# Patient Record
Sex: MALE | Employment: UNEMPLOYED | URBAN - METROPOLITAN AREA
[De-identification: names, ages, dates, MRNs, and addresses within clinical notes are randomized per-mention and may not be internally consistent; named-entity substitution may affect disease eponyms.]

---

## 2018-10-24 PROBLEM — K42.9 UMBILICAL HERNIA WITHOUT OBSTRUCTION AND WITHOUT GANGRENE: Status: ACTIVE | Noted: 2018-10-24

## 2020-09-23 ENCOUNTER — HOSPITAL ENCOUNTER (OUTPATIENT)
Age: 55
Setting detail: SPECIMEN
Discharge: HOME | End: 2020-09-23
Payer: COMMERCIAL

## 2020-09-23 ENCOUNTER — OFFICE VISIT (OUTPATIENT)
Dept: PRIMARY CARE CLINIC | Age: 55
End: 2020-09-23
Payer: COMMERCIAL

## 2020-09-23 VITALS
HEIGHT: 67 IN | HEART RATE: 77 BPM | BODY MASS INDEX: 29.91 KG/M2 | WEIGHT: 190.6 LBS | OXYGEN SATURATION: 93 % | DIASTOLIC BLOOD PRESSURE: 82 MMHG | SYSTOLIC BLOOD PRESSURE: 136 MMHG

## 2020-09-23 LAB
ALBUMIN SERPL-MCNC: 4.1 G/DL (ref 3.5–5.2)
ALBUMIN/GLOBULIN RATIO: 1.3 (ref 1–2.5)
ALP BLD-CCNC: 78 U/L (ref 40–129)
ALT SERPL-CCNC: 18 U/L (ref 5–41)
ANION GAP SERPL CALCULATED.3IONS-SCNC: 17 MMOL/L (ref 9–17)
AST SERPL-CCNC: 21 U/L
BILIRUB SERPL-MCNC: 0.63 MG/DL (ref 0.3–1.2)
BILIRUBIN DIRECT: 0.14 MG/DL
BILIRUBIN, INDIRECT: 0.49 MG/DL (ref 0–1)
BUN BLDV-MCNC: 14 MG/DL (ref 6–20)
BUN/CREAT BLD: NORMAL (ref 9–20)
CALCIUM SERPL-MCNC: 9.3 MG/DL (ref 8.6–10.4)
CHLORIDE BLD-SCNC: 103 MMOL/L (ref 98–107)
CHOLESTEROL, FASTING: 235 MG/DL
CHOLESTEROL/HDL RATIO: 6.2
CO2: 24 MMOL/L (ref 20–31)
CREAT SERPL-MCNC: 0.98 MG/DL (ref 0.7–1.2)
GFR AFRICAN AMERICAN: >60 ML/MIN
GFR NON-AFRICAN AMERICAN: >60 ML/MIN
GFR SERPL CREATININE-BSD FRML MDRD: NORMAL ML/MIN/{1.73_M2}
GFR SERPL CREATININE-BSD FRML MDRD: NORMAL ML/MIN/{1.73_M2}
GLOBULIN: NORMAL G/DL (ref 1.5–3.8)
GLUCOSE FASTING: 81 MG/DL (ref 70–99)
HDLC SERPL-MCNC: 38 MG/DL
LDL CHOLESTEROL: 171 MG/DL (ref 0–130)
POTASSIUM SERPL-SCNC: 4.4 MMOL/L (ref 3.7–5.3)
PROSTATE SPECIFIC ANTIGEN: 1.56 UG/L
SODIUM BLD-SCNC: 144 MMOL/L (ref 135–144)
TOTAL PROTEIN: 7.3 G/DL (ref 6.4–8.3)
TRIGLYCERIDE, FASTING: 128 MG/DL
VLDLC SERPL CALC-MCNC: ABNORMAL MG/DL (ref 1–30)

## 2020-09-23 PROCEDURE — 99396 PREV VISIT EST AGE 40-64: CPT | Performed by: FAMILY MEDICINE

## 2020-09-23 ASSESSMENT — ENCOUNTER SYMPTOMS
NAUSEA: 0
COUGH: 0
DIARRHEA: 0
ABDOMINAL PAIN: 0
WHEEZING: 0
EYE REDNESS: 0
SORE THROAT: 0
SHORTNESS OF BREATH: 0
RHINORRHEA: 0
VOMITING: 0
EYE DISCHARGE: 0

## 2020-09-23 ASSESSMENT — PATIENT HEALTH QUESTIONNAIRE - PHQ9
1. LITTLE INTEREST OR PLEASURE IN DOING THINGS: 0
2. FEELING DOWN, DEPRESSED OR HOPELESS: 0
SUM OF ALL RESPONSES TO PHQ9 QUESTIONS 1 & 2: 0
SUM OF ALL RESPONSES TO PHQ QUESTIONS 1-9: 0
SUM OF ALL RESPONSES TO PHQ QUESTIONS 1-9: 0

## 2020-09-23 NOTE — PROGRESS NOTES
[facility] PRIMARY CARE  [address] [facility] [address]  Dept: [phone]    Arsen Woodruff is a 54 y.o. male who presents today for his medical conditions/complaintsas noted below. Chief Complaint   Patient presents with    Annual Exam    Flu Vaccine     Declined       HPI:     HPI  Patient here for annual exam.  Denies any chest pain or shortness of breath. No lightheadedness or dizziness. Patient had colonoscopy 2018 that showed adenomatous polyp. Denies any melena or hematochezia. Patient states walks on a regular basis and has no limitations. Patient does not smoke. LDL Calculated (mg/dL)   Date Value   11/07/2018 146       (goal LDL is <100)   No results found for: AST, ALT, BUN  BP Readings from Last 3 Encounters:   09/23/20 136/82   10/24/18 136/88          (goal 120/80)    No past medical history on file. Past Surgical History:   Procedure Laterality Date    KNEE ARTHROSCOPY         Family History   Problem Relation Age of Onset    Breast Cancer Mother     Heart Disease Father     Diabetes Paternal Grandmother        Social History     Tobacco Use    Smoking status: Never Smoker    Smokeless tobacco: Never Used   Substance Use Topics    Alcohol use: Not on file     Comment: rarely      No current outpatient medications on file. No current facility-administered medications for this visit.       No Known Allergies    Health Maintenance   Topic Date Due    HIV screen  08/30/1980    DTaP/Tdap/Td vaccine (1 - Tdap) 08/30/1984    Shingles Vaccine (1 of 2) 08/30/2015    Flu vaccine (1) 09/01/2020    Diabetes screen  11/07/2021    Lipid screen  11/07/2023    Colon cancer screen colonoscopy  11/15/2028    Hepatitis C screen  Completed    Hepatitis A vaccine  Aged Out    Hepatitis B vaccine  Aged Out    Hib vaccine  Aged Out    Meningococcal (ACWY) vaccine  Aged Out    Pneumococcal 0-64 years Vaccine  Aged Out       Subjective: Review of Systems   Constitutional: Negative for chills and fever. HENT: Negative for rhinorrhea and sore throat. Eyes: Negative for discharge and redness. Respiratory: Negative for cough, shortness of breath and wheezing. Cardiovascular: Negative for chest pain and palpitations. Gastrointestinal: Negative for abdominal pain, diarrhea, nausea and vomiting. Genitourinary: Negative for dysuria and frequency. Musculoskeletal: Negative for arthralgias and myalgias. Neurological: Negative for dizziness, light-headedness and headaches. Psychiatric/Behavioral: Negative for sleep disturbance. Objective:     /82   Pulse 77   Ht 5' 6.96\" (1.701 m)   Wt 190 lb 9.6 oz (86.5 kg)   SpO2 93%   BMI 29.89 kg/m²   Physical Exam  Vitals signs and nursing note reviewed. Constitutional:       General: He is not in acute distress. Appearance: He is well-developed. He is not ill-appearing. HENT:      Head: Normocephalic and atraumatic. Right Ear: External ear normal.      Left Ear: External ear normal.   Eyes:      General: No scleral icterus. Right eye: No discharge. Left eye: No discharge. Conjunctiva/sclera: Conjunctivae normal.      Pupils: Pupils are equal, round, and reactive to light. Neck:      Thyroid: No thyromegaly. Trachea: No tracheal deviation. Cardiovascular:      Rate and Rhythm: Normal rate and regular rhythm. Heart sounds: Normal heart sounds. Pulmonary:      Effort: Pulmonary effort is normal. No respiratory distress. Breath sounds: Normal breath sounds. No wheezing. Lymphadenopathy:      Cervical: No cervical adenopathy. Skin:     General: Skin is warm. Findings: No rash. Neurological:      Mental Status: He is alert and oriented to person, place, and time. Psychiatric:         Mood and Affect: Mood normal.         Behavior: Behavior normal.         Thought Content:  Thought content normal.         Assessment:

## 2020-12-16 ENCOUNTER — HOSPITAL ENCOUNTER (OUTPATIENT)
Age: 55
Setting detail: SPECIMEN
Discharge: HOME | End: 2020-12-16
Payer: COMMERCIAL

## 2020-12-16 LAB
CHOLESTEROL, FASTING: 254 MG/DL
CHOLESTEROL/HDL RATIO: 4
HDLC SERPL-MCNC: 64 MG/DL
LDL CHOLESTEROL: 178 MG/DL (ref 0–130)
TRIGLYCERIDE, FASTING: 58 MG/DL
VLDLC SERPL CALC-MCNC: ABNORMAL MG/DL (ref 1–30)

## 2024-03-08 ENCOUNTER — HOSPITAL ENCOUNTER (INPATIENT)
Age: 59
LOS: 4 days | Discharge: HOME | DRG: 308 | End: 2024-03-12
Attending: EMERGENCY MEDICINE | Admitting: STUDENT IN AN ORGANIZED HEALTH CARE EDUCATION/TRAINING PROGRAM
Payer: COMMERCIAL

## 2024-03-08 ENCOUNTER — APPOINTMENT (OUTPATIENT)
Dept: GENERAL RADIOLOGY | Age: 59
DRG: 308 | End: 2024-03-08
Payer: COMMERCIAL

## 2024-03-08 DIAGNOSIS — R09.89 PULMONARY CONGESTION: ICD-10-CM

## 2024-03-08 DIAGNOSIS — E87.6 HYPOKALEMIA: ICD-10-CM

## 2024-03-08 DIAGNOSIS — I48.92 ATRIAL FLUTTER WITH RAPID VENTRICULAR RESPONSE (HCC): Primary | ICD-10-CM

## 2024-03-08 DIAGNOSIS — I48.91 ATRIAL FIBRILLATION, UNSPECIFIED TYPE (HCC): ICD-10-CM

## 2024-03-08 DIAGNOSIS — R79.89 ELEVATED BRAIN NATRIURETIC PEPTIDE (BNP) LEVEL: ICD-10-CM

## 2024-03-08 LAB
ALBUMIN SERPL-MCNC: 3.8 G/DL (ref 3.5–5.2)
ALBUMIN/GLOB SERPL: 1.4 {RATIO} (ref 1–2.5)
ALP SERPL-CCNC: 91 U/L (ref 40–129)
ALT SERPL-CCNC: 37 U/L (ref 5–41)
AMPHET UR QL SCN: NEGATIVE
ANION GAP SERPL CALCULATED.3IONS-SCNC: 12 MMOL/L (ref 9–17)
APAP SERPL-MCNC: <5 UG/ML (ref 10–30)
AST SERPL-CCNC: 28 U/L
BARBITURATES UR QL SCN: NEGATIVE
BASOPHILS # BLD: 0.1 K/UL (ref 0–0.2)
BASOPHILS NFR BLD: 1 % (ref 0–2)
BENZODIAZ UR QL: NEGATIVE
BILIRUB SERPL-MCNC: 0.9 MG/DL (ref 0.3–1.2)
BILIRUB UR QL STRIP: NEGATIVE
BNP SERPL-MCNC: 4119 PG/ML
BUN SERPL-MCNC: 17 MG/DL (ref 6–20)
CALCIUM SERPL-MCNC: 8.5 MG/DL (ref 8.6–10.4)
CANNABINOIDS UR QL SCN: NEGATIVE
CHLORIDE SERPL-SCNC: 106 MMOL/L (ref 98–107)
CLARITY UR: CLEAR
CO2 SERPL-SCNC: 25 MMOL/L (ref 20–31)
COCAINE UR QL SCN: NEGATIVE
COLOR UR: YELLOW
CREAT SERPL-MCNC: 1.2 MG/DL (ref 0.7–1.2)
EOSINOPHIL # BLD: 0.1 K/UL (ref 0–0.4)
EOSINOPHILS RELATIVE PERCENT: 2 % (ref 1–4)
EPI CELLS #/AREA URNS HPF: ABNORMAL /HPF (ref 0–5)
ERYTHROCYTE [DISTWIDTH] IN BLOOD BY AUTOMATED COUNT: 14.1 % (ref 12.5–15.4)
ETHANOL PERCENT: <0.01 %
ETHANOLAMINE SERPL-MCNC: <10 MG/DL
FENTANYL UR QL: NEGATIVE
GFR SERPL CREATININE-BSD FRML MDRD: >60 ML/MIN/1.73M2
GLUCOSE SERPL-MCNC: 105 MG/DL (ref 70–99)
GLUCOSE UR STRIP-MCNC: NEGATIVE MG/DL
HCT VFR BLD AUTO: 39 % (ref 41–53)
HGB BLD-MCNC: 13.4 G/DL (ref 13.5–17.5)
HGB UR QL STRIP.AUTO: NEGATIVE
INR PPP: 1
KETONES UR STRIP-MCNC: ABNORMAL MG/DL
LACTATE BLDV-SCNC: 1.6 MMOL/L (ref 0.5–2.2)
LEUKOCYTE ESTERASE UR QL STRIP: NEGATIVE
LYMPHOCYTES NFR BLD: 1.2 K/UL (ref 1–4.8)
LYMPHOCYTES RELATIVE PERCENT: 15 % (ref 24–44)
MAGNESIUM SERPL-MCNC: 2.4 MG/DL (ref 1.6–2.6)
MCH RBC QN AUTO: 30.9 PG (ref 26–34)
MCHC RBC AUTO-ENTMCNC: 34.4 G/DL (ref 31–37)
MCV RBC AUTO: 89.9 FL (ref 80–100)
METHADONE UR QL: NEGATIVE
MONOCYTES NFR BLD: 0.7 K/UL (ref 0.1–1.2)
MONOCYTES NFR BLD: 9 % (ref 2–11)
MUCOUS THREADS URNS QL MICRO: ABNORMAL
NEUTROPHILS NFR BLD: 73 % (ref 36–66)
NEUTS SEG NFR BLD: 5.7 K/UL (ref 1.8–7.7)
NITRITE UR QL STRIP: NEGATIVE
OPIATES UR QL SCN: NEGATIVE
OXYCODONE UR QL SCN: NEGATIVE
PARTIAL THROMBOPLASTIN TIME: 25.4 SEC (ref 21.3–31.3)
PARTIAL THROMBOPLASTIN TIME: 32 SEC (ref 21.3–31.3)
PCP UR QL SCN: NEGATIVE
PH UR STRIP: 6 [PH] (ref 5–8)
PLATELET # BLD AUTO: 197 K/UL (ref 140–450)
PMV BLD AUTO: 8.8 FL (ref 6–12)
POTASSIUM SERPL-SCNC: 3.5 MMOL/L (ref 3.7–5.3)
PROT SERPL-MCNC: 6.5 G/DL (ref 6.4–8.3)
PROT UR STRIP-MCNC: NEGATIVE MG/DL
PROTHROMBIN TIME: 11.1 SEC (ref 9.4–12.6)
RBC # BLD AUTO: 4.34 M/UL (ref 4.5–5.9)
RBC #/AREA URNS HPF: ABNORMAL /HPF (ref 0–2)
SALICYLATES SERPL-MCNC: <1 MG/DL (ref 3–10)
SODIUM SERPL-SCNC: 143 MMOL/L (ref 135–144)
SP GR UR STRIP: 1.02 (ref 1–1.03)
TEST INFORMATION: NORMAL
TROPONIN I SERPL HS-MCNC: 16 NG/L (ref 0–22)
TROPONIN I SERPL HS-MCNC: 17 NG/L (ref 0–22)
TSH SERPL DL<=0.05 MIU/L-ACNC: 3.61 UIU/ML (ref 0.3–5)
UROBILINOGEN UR STRIP-ACNC: NORMAL EU/DL (ref 0–1)
WBC #/AREA URNS HPF: ABNORMAL /HPF (ref 0–5)
WBC OTHER # BLD: 7.8 K/UL (ref 3.5–11)

## 2024-03-08 PROCEDURE — 83735 ASSAY OF MAGNESIUM: CPT

## 2024-03-08 PROCEDURE — 80179 DRUG ASSAY SALICYLATE: CPT

## 2024-03-08 PROCEDURE — 6360000002 HC RX W HCPCS: Performed by: EMERGENCY MEDICINE

## 2024-03-08 PROCEDURE — 96374 THER/PROPH/DIAG INJ IV PUSH: CPT

## 2024-03-08 PROCEDURE — 93005 ELECTROCARDIOGRAM TRACING: CPT | Performed by: EMERGENCY MEDICINE

## 2024-03-08 PROCEDURE — 71045 X-RAY EXAM CHEST 1 VIEW: CPT

## 2024-03-08 PROCEDURE — 6370000000 HC RX 637 (ALT 250 FOR IP): Performed by: EMERGENCY MEDICINE

## 2024-03-08 PROCEDURE — 81001 URINALYSIS AUTO W/SCOPE: CPT

## 2024-03-08 PROCEDURE — 2500000003 HC RX 250 WO HCPCS

## 2024-03-08 PROCEDURE — 84443 ASSAY THYROID STIM HORMONE: CPT

## 2024-03-08 PROCEDURE — 85610 PROTHROMBIN TIME: CPT

## 2024-03-08 PROCEDURE — 83880 ASSAY OF NATRIURETIC PEPTIDE: CPT

## 2024-03-08 PROCEDURE — 99285 EMERGENCY DEPT VISIT HI MDM: CPT

## 2024-03-08 PROCEDURE — 2580000003 HC RX 258: Performed by: STUDENT IN AN ORGANIZED HEALTH CARE EDUCATION/TRAINING PROGRAM

## 2024-03-08 PROCEDURE — 96375 TX/PRO/DX INJ NEW DRUG ADDON: CPT

## 2024-03-08 PROCEDURE — 80143 DRUG ASSAY ACETAMINOPHEN: CPT

## 2024-03-08 PROCEDURE — 2500000003 HC RX 250 WO HCPCS: Performed by: EMERGENCY MEDICINE

## 2024-03-08 PROCEDURE — 1210000000 HC MED SURG R&B

## 2024-03-08 PROCEDURE — G0480 DRUG TEST DEF 1-7 CLASSES: HCPCS

## 2024-03-08 PROCEDURE — 85730 THROMBOPLASTIN TIME PARTIAL: CPT

## 2024-03-08 PROCEDURE — 2580000003 HC RX 258: Performed by: EMERGENCY MEDICINE

## 2024-03-08 PROCEDURE — 99221 1ST HOSP IP/OBS SF/LOW 40: CPT | Performed by: STUDENT IN AN ORGANIZED HEALTH CARE EDUCATION/TRAINING PROGRAM

## 2024-03-08 PROCEDURE — 80053 COMPREHEN METABOLIC PANEL: CPT

## 2024-03-08 PROCEDURE — 36415 COLL VENOUS BLD VENIPUNCTURE: CPT

## 2024-03-08 PROCEDURE — 84484 ASSAY OF TROPONIN QUANT: CPT

## 2024-03-08 PROCEDURE — 85025 COMPLETE CBC W/AUTO DIFF WBC: CPT

## 2024-03-08 PROCEDURE — 6370000000 HC RX 637 (ALT 250 FOR IP): Performed by: STUDENT IN AN ORGANIZED HEALTH CARE EDUCATION/TRAINING PROGRAM

## 2024-03-08 PROCEDURE — 80307 DRUG TEST PRSMV CHEM ANLYZR: CPT

## 2024-03-08 PROCEDURE — 83605 ASSAY OF LACTIC ACID: CPT

## 2024-03-08 RX ORDER — POTASSIUM CHLORIDE 20 MEQ/1
40 TABLET, EXTENDED RELEASE ORAL PRN
Status: DISCONTINUED | OUTPATIENT
Start: 2024-03-08 | End: 2024-03-12 | Stop reason: HOSPADM

## 2024-03-08 RX ORDER — ONDANSETRON 4 MG/1
4 TABLET, ORALLY DISINTEGRATING ORAL EVERY 8 HOURS PRN
Status: DISCONTINUED | OUTPATIENT
Start: 2024-03-08 | End: 2024-03-12 | Stop reason: HOSPADM

## 2024-03-08 RX ORDER — HEPARIN SODIUM 1000 [USP'U]/ML
4000 INJECTION, SOLUTION INTRAVENOUS; SUBCUTANEOUS ONCE
Status: COMPLETED | OUTPATIENT
Start: 2024-03-08 | End: 2024-03-08

## 2024-03-08 RX ORDER — DILTIAZEM HYDROCHLORIDE 5 MG/ML
5 INJECTION INTRAVENOUS ONCE
Status: COMPLETED | OUTPATIENT
Start: 2024-03-08 | End: 2024-03-08

## 2024-03-08 RX ORDER — POTASSIUM CHLORIDE 20 MEQ/1
40 TABLET, EXTENDED RELEASE ORAL ONCE
Status: COMPLETED | OUTPATIENT
Start: 2024-03-08 | End: 2024-03-08

## 2024-03-08 RX ORDER — HEPARIN SODIUM 1000 [USP'U]/ML
4000 INJECTION, SOLUTION INTRAVENOUS; SUBCUTANEOUS PRN
Status: DISCONTINUED | OUTPATIENT
Start: 2024-03-08 | End: 2024-03-12

## 2024-03-08 RX ORDER — FUROSEMIDE 10 MG/ML
20 INJECTION INTRAMUSCULAR; INTRAVENOUS DAILY
Status: DISCONTINUED | OUTPATIENT
Start: 2024-03-09 | End: 2024-03-12

## 2024-03-08 RX ORDER — FUROSEMIDE 10 MG/ML
20 INJECTION INTRAMUSCULAR; INTRAVENOUS ONCE
Status: COMPLETED | OUTPATIENT
Start: 2024-03-08 | End: 2024-03-08

## 2024-03-08 RX ORDER — POTASSIUM CHLORIDE 7.45 MG/ML
10 INJECTION INTRAVENOUS ONCE
Status: COMPLETED | OUTPATIENT
Start: 2024-03-08 | End: 2024-03-08

## 2024-03-08 RX ORDER — SODIUM CHLORIDE 0.9 % (FLUSH) 0.9 %
5-40 SYRINGE (ML) INJECTION EVERY 12 HOURS SCHEDULED
Status: DISCONTINUED | OUTPATIENT
Start: 2024-03-08 | End: 2024-03-12 | Stop reason: HOSPADM

## 2024-03-08 RX ORDER — MAGNESIUM SULFATE IN WATER 40 MG/ML
2000 INJECTION, SOLUTION INTRAVENOUS PRN
Status: DISCONTINUED | OUTPATIENT
Start: 2024-03-08 | End: 2024-03-12 | Stop reason: HOSPADM

## 2024-03-08 RX ORDER — POLYETHYLENE GLYCOL 3350 17 G/17G
17 POWDER, FOR SOLUTION ORAL DAILY PRN
Status: DISCONTINUED | OUTPATIENT
Start: 2024-03-08 | End: 2024-03-12 | Stop reason: HOSPADM

## 2024-03-08 RX ORDER — SODIUM CHLORIDE 0.9 % (FLUSH) 0.9 %
5-40 SYRINGE (ML) INJECTION PRN
Status: DISCONTINUED | OUTPATIENT
Start: 2024-03-08 | End: 2024-03-12 | Stop reason: HOSPADM

## 2024-03-08 RX ORDER — ONDANSETRON 2 MG/ML
4 INJECTION INTRAMUSCULAR; INTRAVENOUS EVERY 6 HOURS PRN
Status: DISCONTINUED | OUTPATIENT
Start: 2024-03-08 | End: 2024-03-12 | Stop reason: HOSPADM

## 2024-03-08 RX ORDER — POTASSIUM CHLORIDE 7.45 MG/ML
10 INJECTION INTRAVENOUS PRN
Status: DISCONTINUED | OUTPATIENT
Start: 2024-03-08 | End: 2024-03-12 | Stop reason: HOSPADM

## 2024-03-08 RX ORDER — HEPARIN SODIUM 10000 [USP'U]/100ML
5-30 INJECTION, SOLUTION INTRAVENOUS CONTINUOUS
Status: DISCONTINUED | OUTPATIENT
Start: 2024-03-08 | End: 2024-03-12

## 2024-03-08 RX ORDER — ACETAMINOPHEN 325 MG/1
650 TABLET ORAL EVERY 6 HOURS PRN
Status: DISCONTINUED | OUTPATIENT
Start: 2024-03-08 | End: 2024-03-12 | Stop reason: HOSPADM

## 2024-03-08 RX ORDER — HEPARIN SODIUM 1000 [USP'U]/ML
2000 INJECTION, SOLUTION INTRAVENOUS; SUBCUTANEOUS PRN
Status: DISCONTINUED | OUTPATIENT
Start: 2024-03-08 | End: 2024-03-12

## 2024-03-08 RX ORDER — ACETAMINOPHEN 650 MG/1
650 SUPPOSITORY RECTAL EVERY 6 HOURS PRN
Status: DISCONTINUED | OUTPATIENT
Start: 2024-03-08 | End: 2024-03-12 | Stop reason: HOSPADM

## 2024-03-08 RX ORDER — SODIUM CHLORIDE 9 MG/ML
INJECTION, SOLUTION INTRAVENOUS PRN
Status: DISCONTINUED | OUTPATIENT
Start: 2024-03-08 | End: 2024-03-12 | Stop reason: HOSPADM

## 2024-03-08 RX ORDER — DILTIAZEM HYDROCHLORIDE 5 MG/ML
INJECTION INTRAVENOUS
Status: COMPLETED
Start: 2024-03-08 | End: 2024-03-08

## 2024-03-08 RX ADMIN — METOPROLOL TARTRATE 25 MG: 25 TABLET, FILM COATED ORAL at 22:54

## 2024-03-08 RX ADMIN — POTASSIUM CHLORIDE 10 MEQ: 7.46 INJECTION, SOLUTION INTRAVENOUS at 14:59

## 2024-03-08 RX ADMIN — HEPARIN SODIUM 4000 UNITS: 1000 INJECTION INTRAVENOUS; SUBCUTANEOUS at 14:20

## 2024-03-08 RX ADMIN — SODIUM CHLORIDE, PRESERVATIVE FREE 10 ML: 5 INJECTION INTRAVENOUS at 22:53

## 2024-03-08 RX ADMIN — DILTIAZEM HYDROCHLORIDE 5 MG: 5 INJECTION INTRAVENOUS at 15:33

## 2024-03-08 RX ADMIN — FUROSEMIDE 20 MG: 10 INJECTION, SOLUTION INTRAMUSCULAR; INTRAVENOUS at 15:19

## 2024-03-08 RX ADMIN — POTASSIUM CHLORIDE 40 MEQ: 1500 TABLET, EXTENDED RELEASE ORAL at 14:51

## 2024-03-08 RX ADMIN — HEPARIN SODIUM 10 UNITS/KG/HR: 10000 INJECTION, SOLUTION INTRAVENOUS at 14:21

## 2024-03-08 RX ADMIN — DILTIAZEM HYDROCHLORIDE 5 MG/HR: 5 INJECTION, SOLUTION INTRAVENOUS at 14:13

## 2024-03-08 RX ADMIN — HEPARIN SODIUM 2000 UNITS: 1000 INJECTION INTRAVENOUS; SUBCUTANEOUS at 21:49

## 2024-03-08 RX ADMIN — DILTIAZEM HYDROCHLORIDE 12.5 MG/HR: 5 INJECTION, SOLUTION INTRAVENOUS at 22:53

## 2024-03-08 NOTE — H&P
[facility]  Office: [phone]  Carl Cardenas DO, Yonathan Mejia DO, Jalen Benjamin DO, Toni Green DO, Loren Tam MD, Gale Nguyen MD, Daphnei De Guzman MD, Marielle Pérez MD,  Raphael Berg MD, Laz Dooley MD, Brock Antoine MD,  Yudelka Carrillo DO, Urbano Manjarrez MD, Arian Juan MD, Troy Cardenas DO, Livier Dozier MD,  Hernan Montalvo DO, Annamarie Vegas MD, Blanca Rodriguez MD, Fátima Collado MD, Matthew Borrego MD,  Forrest Corbett MD, Yasmany Macias MD, Elvis Mccarty MD, Sridhar Damon MD, Herbie Johnson MD, Gatito He MD, Feliciano Hussein DO, Dwayne Martinez DO, Awa Thapa MD,  Reese Lucero MD, Shirley Waterhouse, CNP,  Ludy Hollis, CNP, Yfn Agosto, CNP,  Tasha Mcclain, DNP, Margarita Crouch, CNP, Julissa Hernandez, CNP, Jazmine Gordon CNP, Elana Ferreira, CNP, Fani Travis, CNP, Viv Stephens, PA-C, Amanda Robles, PA-C, Genesis Macias, CNP, Allyson Tay, CNP, Elise Swan, CNP, Rebeka Eagle, CNS, Dayanara Levy, CNP, Mildred Grewal, CNP, Tracy Schwab, CNP         [facility]   IN-PATIENT SERVICE   [facility]    HISTORY AND PHYSICAL EXAMINATION            Date:   3/8/2024  Patient name:  Antony Vieyra  Date of admission:  3/8/2024  1:32 PM  MRN:   [id number]  Account:  [id number]  YOB: 1965  PCP:    No primary care provider on file.  Room:   ER07/ER07  Code Status:    No Order    Chief Complaint:     Chief Complaint   Patient presents with    Chest Pain    Shortness of Breath       History Obtained From:     patient    History of Present Illness:     Antony Vieyra is a 58 y.o. Non- / non  male who presents with Chest Pain and Shortness of Breath   and is admitted to the hospital for the management of A-fib (HCC).    58-year-old male with no significant past medical history who initially presented to urgent care due to feeling sick for the last few days, generalized weakness lightheaded, difficulty breathing        Plan:     Patient status inpatient in the Med/Surge    New onset of A-fib RVR, continue with diltiazem drip, will start metoprolol and will try to wean off drip, heparin drip, monitor on telemetry, correct electrolyte abnormality to keep potassium above 4 and magnesium above 2, 2D echo to evaluate ejection fraction or any wall motion or valvular abnormalities, cardiology was consulted will follow recommendation  DVT prophylaxis on heparin  Hypokalemia will replace and monitor    Consultations:   IP CONSULT TO CARDIOLOGY  IP CONSULT TO HOSPITALIST     Patient is admitted as inpatient status because of co-morbidities listed above, severity of signs and symptoms as outlined, requirement for current medical therapies and most importantly because of direct risk to patient if care not provided in a hospital setting.  Expected length of stay > 48 hours.    Sridhar Damon MD  3/8/2024  4:47 PM    Copy sent to Dr. Puentes primary care provider on file.

## 2024-03-08 NOTE — ED TRIAGE NOTES
PT presents to Ed with c/o CP and SOB pt was in AFIB RVR for EMS and received cardizem IVP enroute to Ed

## 2024-03-08 NOTE — ED PROVIDER NOTES
file     Social Determinants of Health     Financial Resource Strain: Not on file   Food Insecurity: Not on file   Transportation Needs: Not on file   Physical Activity: Not on file   Stress: Not on file   Social Connections: Not on file   Intimate Partner Violence: Not on file   Housing Stability: Not on file       Family History   Problem Relation Age of Onset    Breast Cancer Mother     Heart Disease Father     Diabetes Paternal Grandmother        Allergies:    Patient has no known allergies.    Home Medications:  Prior to Admission medications    Not on File       REVIEW OF SYSTEMS     Review of Systems   Constitutional:  Negative for diaphoresis.   Respiratory:  Positive for chest tightness and shortness of breath.    Cardiovascular:  Positive for chest pain and palpitations. Negative for leg swelling.   Gastrointestinal:  Positive for nausea. Negative for vomiting.   Musculoskeletal:  Negative for back pain and neck pain.   Neurological:  Positive for dizziness and light-headedness.       PHYSICAL EXAM    VITALS:   Vitals:    03/08/24 1516 03/08/24 1519 03/08/24 1523 03/08/24 1533   BP:  (!) 133/105     Pulse: (!) 138  (!) 158 (!) 163   Resp:       Temp:       TempSrc:       SpO2:       Weight:       Height:           Physical Exam  Vitals and nursing note reviewed.   Constitutional:       General: He is not in acute distress.     Appearance: He is well-developed. He is not diaphoretic.   HENT:      Head: Normocephalic and atraumatic.      Mouth/Throat:      Mouth: Mucous membranes are moist.   Eyes:      Conjunctiva/sclera: Conjunctivae normal.   Cardiovascular:      Rate and Rhythm: Tachycardia present. Rhythm irregularly irregular.      Heart sounds: Normal heart sounds.   Pulmonary:      Effort: Pulmonary effort is normal. No respiratory distress.      Breath sounds: Normal breath sounds. No wheezing, rhonchi or rales.   Abdominal:      General: There is no distension.      Palpations: Abdomen is soft.      troponins I do expect a gradual increase secondary to tachyarrhythmia.  No significant anemia.  No leukocytosis.  TSH within normal limits.  Serum tox negative.  Mild hypokalemia which was replaced oral and IV.  Magnesium within normal limits.  His BNP is significantly elevated at 4200, which again could be secondary to tachyarrhythmia.  However there is also some pulmonary congestion identified on chest x-ray.  He was given a one-time dose of 20 mg IV Lasix he was placed on a Cardizem drip as well as heparin drip.  Did touch base with cardiology, they agree with current management and will be on as consult but admission primarily was to the hospitalist.  They did request an echo, I did place an order for this.       Amount and/or Complexity of Data Reviewed  Clinical lab tests: ordered and reviewed  Tests in the radiology section of CPT®: reviewed and ordered  Review and summarize past medical records: yes  Discuss the patient with other providers: yes  Independent visualization of images, tracings, or specimens: yes    Critical Care  Total time providing critical care: 43 minutes        PROCEDURES:  Procedures     CONSULTS:  IP CONSULT TO CARDIOLOGY  IP CONSULT TO HOSPITALIST    CRITICAL CARE:  There was significant risk of life threatening deterioration of patient's condition requiring my direct management. Critical care time 43 minutes, excluding any documented procedures.    FINAL IMPRESSION     1. Atrial flutter with rapid ventricular response (HCC)    2. Elevated brain natriuretic peptide (BNP) level    3. Hypokalemia    4. Pulmonary congestion        DISPOSITION / PLAN   DISPOSITION Admitted 03/08/2024 03:30:48 PM      Elena Fung DO  Emergency Medicine Physician    (Please note that portions of this note were completed with a voice recognition program.  Efforts were made to edit the dictations but occasionally words are mis-transcribed.)       Elena Fung DO  03/08/24 4633

## 2024-03-09 LAB
ANION GAP SERPL CALCULATED.3IONS-SCNC: 9 MMOL/L (ref 9–17)
BUN SERPL-MCNC: 12 MG/DL (ref 6–20)
CALCIUM SERPL-MCNC: 8.5 MG/DL (ref 8.6–10.4)
CHLORIDE SERPL-SCNC: 102 MMOL/L (ref 98–107)
CO2 SERPL-SCNC: 26 MMOL/L (ref 20–31)
CREAT SERPL-MCNC: 1.1 MG/DL (ref 0.7–1.2)
EKG ATRIAL RATE: 330 BPM
EKG P AXIS: 54 DEGREES
EKG Q-T INTERVAL: 358 MS
EKG QRS DURATION: 84 MS
EKG QTC CALCULATION (BAZETT): 508 MS
EKG R AXIS: 68 DEGREES
EKG T AXIS: 47 DEGREES
EKG VENTRICULAR RATE: 121 BPM
GFR SERPL CREATININE-BSD FRML MDRD: >60 ML/MIN/1.73M2
GLUCOSE SERPL-MCNC: 124 MG/DL (ref 70–99)
MAGNESIUM SERPL-MCNC: 2.1 MG/DL (ref 1.6–2.6)
PARTIAL THROMBOPLASTIN TIME: 39.3 SEC (ref 21.3–31.3)
PARTIAL THROMBOPLASTIN TIME: 44.5 SEC (ref 21.3–31.3)
PARTIAL THROMBOPLASTIN TIME: 54.1 SEC (ref 21.3–31.3)
PARTIAL THROMBOPLASTIN TIME: 59.1 SEC (ref 21.3–31.3)
POTASSIUM SERPL-SCNC: 3.1 MMOL/L (ref 3.7–5.3)
SODIUM SERPL-SCNC: 137 MMOL/L (ref 135–144)
TOXIC TRICYCLIC SC,BLOOD: NEGATIVE

## 2024-03-09 PROCEDURE — 6360000002 HC RX W HCPCS: Performed by: EMERGENCY MEDICINE

## 2024-03-09 PROCEDURE — 6370000000 HC RX 637 (ALT 250 FOR IP): Performed by: STUDENT IN AN ORGANIZED HEALTH CARE EDUCATION/TRAINING PROGRAM

## 2024-03-09 PROCEDURE — 2500000003 HC RX 250 WO HCPCS: Performed by: EMERGENCY MEDICINE

## 2024-03-09 PROCEDURE — 83735 ASSAY OF MAGNESIUM: CPT

## 2024-03-09 PROCEDURE — 2580000003 HC RX 258: Performed by: STUDENT IN AN ORGANIZED HEALTH CARE EDUCATION/TRAINING PROGRAM

## 2024-03-09 PROCEDURE — 2580000003 HC RX 258: Performed by: EMERGENCY MEDICINE

## 2024-03-09 PROCEDURE — 2500000003 HC RX 250 WO HCPCS: Performed by: NURSE PRACTITIONER

## 2024-03-09 PROCEDURE — 99231 SBSQ HOSP IP/OBS SF/LOW 25: CPT | Performed by: STUDENT IN AN ORGANIZED HEALTH CARE EDUCATION/TRAINING PROGRAM

## 2024-03-09 PROCEDURE — 36415 COLL VENOUS BLD VENIPUNCTURE: CPT

## 2024-03-09 PROCEDURE — 85730 THROMBOPLASTIN TIME PARTIAL: CPT

## 2024-03-09 PROCEDURE — 80048 BASIC METABOLIC PNL TOTAL CA: CPT

## 2024-03-09 PROCEDURE — 6360000002 HC RX W HCPCS: Performed by: STUDENT IN AN ORGANIZED HEALTH CARE EDUCATION/TRAINING PROGRAM

## 2024-03-09 PROCEDURE — 1210000000 HC MED SURG R&B

## 2024-03-09 RX ORDER — POTASSIUM CHLORIDE 20 MEQ/1
40 TABLET, EXTENDED RELEASE ORAL 2 TIMES DAILY WITH MEALS
Status: COMPLETED | OUTPATIENT
Start: 2024-03-09 | End: 2024-03-09

## 2024-03-09 RX ORDER — GUAIFENESIN 200 MG/10ML
200 LIQUID ORAL EVERY 4 HOURS PRN
Status: DISCONTINUED | OUTPATIENT
Start: 2024-03-09 | End: 2024-03-12 | Stop reason: HOSPADM

## 2024-03-09 RX ADMIN — POTASSIUM CHLORIDE 40 MEQ: 1500 TABLET, EXTENDED RELEASE ORAL at 12:07

## 2024-03-09 RX ADMIN — ACETAMINOPHEN 650 MG: 325 TABLET ORAL at 11:16

## 2024-03-09 RX ADMIN — HEPARIN SODIUM 14 UNITS/KG/HR: 10000 INJECTION, SOLUTION INTRAVENOUS at 10:49

## 2024-03-09 RX ADMIN — DILTIAZEM HYDROCHLORIDE 7 MG/HR: 5 INJECTION, SOLUTION INTRAVENOUS at 13:47

## 2024-03-09 RX ADMIN — METOPROLOL TARTRATE 25 MG: 25 TABLET, FILM COATED ORAL at 09:09

## 2024-03-09 RX ADMIN — HEPARIN SODIUM 2000 UNITS: 1000 INJECTION INTRAVENOUS; SUBCUTANEOUS at 04:37

## 2024-03-09 RX ADMIN — GUAIFENESIN 200 MG: 200 SOLUTION ORAL at 21:36

## 2024-03-09 RX ADMIN — HEPARIN SODIUM 2000 UNITS: 1000 INJECTION INTRAVENOUS; SUBCUTANEOUS at 16:24

## 2024-03-09 RX ADMIN — FUROSEMIDE 20 MG: 10 INJECTION, SOLUTION INTRAMUSCULAR; INTRAVENOUS at 09:07

## 2024-03-09 RX ADMIN — POTASSIUM CHLORIDE 40 MEQ: 1500 TABLET, EXTENDED RELEASE ORAL at 16:34

## 2024-03-09 RX ADMIN — SODIUM CHLORIDE, PRESERVATIVE FREE 10 ML: 5 INJECTION INTRAVENOUS at 20:27

## 2024-03-09 RX ADMIN — METOPROLOL TARTRATE 25 MG: 25 TABLET, FILM COATED ORAL at 20:26

## 2024-03-09 ASSESSMENT — PAIN - FUNCTIONAL ASSESSMENT: PAIN_FUNCTIONAL_ASSESSMENT: NONE - DENIES PAIN

## 2024-03-09 ASSESSMENT — PAIN DESCRIPTION - LOCATION: LOCATION: HEAD

## 2024-03-09 ASSESSMENT — PAIN SCALES - GENERAL
PAINLEVEL_OUTOF10: 2
PAINLEVEL_OUTOF10: 1

## 2024-03-09 NOTE — PLAN OF CARE
Problem: Pain  Goal: Verbalizes/displays adequate comfort level or baseline comfort level  Outcome: Progressing  Flowsheets (Taken 3/9/2024 1425)  Verbalizes/displays adequate comfort level or baseline comfort level:   Encourage patient to monitor pain and request assistance   Assess pain using appropriate pain scale   Administer analgesics based on type and severity of pain and evaluate response   Implement non-pharmacological measures as appropriate and evaluate response   Consider cultural and social influences on pain and pain management     Problem: ABCDS Injury Assessment  Goal: Absence of physical injury  Outcome: Progressing  Flowsheets (Taken 3/9/2024 1425)  Absence of Physical Injury: Implement safety measures based on patient assessment

## 2024-03-09 NOTE — PROGRESS NOTES
[facility]  Office: [phone]  Carl Cardenas DO, Yonathan Mejia DO, Jalen Benjamin DO, Toni Green DO, Loren Tam MD, Gale Nguyen MD, Daphnie De Guzman MD, Marielle Pérez MD,  Raphael Berg MD, Laz Dooley MD, Brock Antoine MD,  Yudekla Carrillo DO, Urbano Manjarrez MD, Arian Juan MD, Troy Cardenas DO, Livier Dozier MD,  Hernan Montalvo DO, Annamarie Vegas MD, Blanca Rodriguez MD, Fátima Collado MD, Matthew Borrego MD,  Forrest Corbett MD, Yasmany Macias MD, Elvis Mccarty MD, Sridhar Damon MD, Herbie Johnson MD, Gatiot He MD, Feliciano Hussein DO, Dwayne Martinez DO, Awa Thapa MD,  Reese Lucero MD, Shirley Waterhouse, CNP,  Ludy Hollis CNP, Yfn Agosto, CNP,  Tasha Mcclain, DNP, Margarita Crouch, CNP, Julissa Hernandez, CNP, Jazmine Gordon CNP, Elana Ferreira, CNP, Fani Travis, CNP, Viv Stephens, PA-C, Amanda Robles, PA-C, Genesis Macias, CNP, Allyson Tay, CNP, Elise Swan, CNP, Rebeka Eagle, CNS, Dayanara Levy, CNP, Mildred Grewal, CNP, Tracy Schwab, CNP         [facility]   IN-PATIENT SERVICE   [facility]    Progress Note    3/9/2024    11:41 AM    Name:   Antony Vieyra  MRN:     [id number]     Acct:      [id number]   Room:   ER/[address] Day:  1  Admit Date:  3/8/2024  1:32 PM    PCP:   No primary care provider on file.  Code Status:  Full Code    Subjective:     C/C:   Chief Complaint   Patient presents with    Chest Pain    Shortness of Breath     Interval History Status: improved.     Seen and examined, denies any complaint  Remain on diltiazem drip, was started on metoprolol in wean off drip as tolerated  2D echo ordered  Vital signs and lab were reviewed    Review of Systems:     Review of Systems   Constitutional: Negative.    HENT: Negative.     Respiratory: Negative.     Cardiovascular: Negative.    Gastrointestinal: Negative.    Psychiatric/Behavioral: Negative.         Medications:     Allergies:  No Known    LABGLOM >60  --  >60   CALCIUM 8.5*  --  8.5*   PROBNP 4,119*  --   --    TROPHS 16 17  --      Recent Labs     03/08/24  1336   PROT 6.5   LABALBU 3.8   TSH 3.61   AST 28   ALT 37   ALKPHOS 91   BILITOT 0.9     ABG:No results found for: \"POCPH\", \"PHART\", \"PH\", \"POCPCO2\", \"EDS1XRN\", \"PCO2\", \"POCPO2\", \"PO2ART\", \"PO2\", \"POCHCO3\", \"LGG8OXJ\", \"HCO3\", \"NBEA\", \"PBEA\", \"BEART\", \"BE\", \"THGBART\", \"THB\", \"SRQ1WDB\", \"YFSI6TQO\", \"A3UGOSUW\", \"O2SAT\", \"FIO2\"  No results found for: \"SPECIAL\"  No results found for: \"CULTURE\"    Radiology:  XR CHEST PORTABLE    Result Date: 3/8/2024  Mild perihilar and bibasilar edema.  Superimposed right basilar infiltrate not excluded.       Physical Examination:     Physical Exam  Constitutional:       General: He is not in acute distress.     Appearance: Normal appearance.   HENT:      Head: Normocephalic and atraumatic.      Mouth/Throat:      Mouth: Mucous membranes are moist.   Eyes:      Extraocular Movements: Extraocular movements intact.      Pupils: Pupils are equal, round, and reactive to light.   Cardiovascular:      Rate and Rhythm: Tachycardia present. Rhythm irregular.      Heart sounds: No murmur heard.  Pulmonary:      Effort: No respiratory distress.      Breath sounds: No wheezing or rales.   Abdominal:      General: There is no distension.      Tenderness: There is no abdominal tenderness. There is no rebound.   Musculoskeletal:         General: No deformity.      Cervical back: No rigidity or tenderness.      Right lower leg: No edema.      Left lower leg: No edema.   Lymphadenopathy:      Cervical: No cervical adenopathy.   Skin:     Coloration: Skin is not jaundiced or pale.      Findings: No lesion or rash.   Neurological:      General: No focal deficit present.      Mental Status: He is alert and oriented to person, place, and time.      Sensory: No sensory deficit.      Motor: No weakness.   Psychiatric:         Mood and Affect: Mood normal.         Assessment:  Lot # (Optional): [id number] Lot # (Optional): [id number]

## 2024-03-10 ENCOUNTER — APPOINTMENT (OUTPATIENT)
Dept: GENERAL RADIOLOGY | Age: 59
DRG: 308 | End: 2024-03-10
Payer: COMMERCIAL

## 2024-03-10 PROBLEM — I48.92 ATRIAL FLUTTER WITH RAPID VENTRICULAR RESPONSE (HCC): Status: ACTIVE | Noted: 2024-03-10

## 2024-03-10 PROBLEM — I10 UNCONTROLLED HYPERTENSION: Status: ACTIVE | Noted: 2024-03-10

## 2024-03-10 LAB
ANION GAP SERPL CALCULATED.3IONS-SCNC: 10 MMOL/L (ref 9–17)
BUN SERPL-MCNC: 16 MG/DL (ref 6–20)
CALCIUM SERPL-MCNC: 8.8 MG/DL (ref 8.6–10.4)
CHLORIDE SERPL-SCNC: 102 MMOL/L (ref 98–107)
CO2 SERPL-SCNC: 23 MMOL/L (ref 20–31)
CREAT SERPL-MCNC: 1.2 MG/DL (ref 0.7–1.2)
ERYTHROCYTE [DISTWIDTH] IN BLOOD BY AUTOMATED COUNT: 14.1 % (ref 12.5–15.4)
GFR SERPL CREATININE-BSD FRML MDRD: >60 ML/MIN/1.73M2
GLUCOSE SERPL-MCNC: 102 MG/DL (ref 70–99)
HCT VFR BLD AUTO: 40.3 % (ref 41–53)
HGB BLD-MCNC: 13.6 G/DL (ref 13.5–17.5)
MAGNESIUM SERPL-MCNC: 2.2 MG/DL (ref 1.6–2.6)
MCH RBC QN AUTO: 30.4 PG (ref 26–34)
MCHC RBC AUTO-ENTMCNC: 33.6 G/DL (ref 31–37)
MCV RBC AUTO: 90.5 FL (ref 80–100)
PARTIAL THROMBOPLASTIN TIME: 56.4 SEC (ref 21.3–31.3)
PHOSPHATE SERPL-MCNC: 2.7 MG/DL (ref 2.5–4.5)
PLATELET # BLD AUTO: 223 K/UL (ref 140–450)
PMV BLD AUTO: 8.8 FL (ref 6–12)
POTASSIUM SERPL-SCNC: 3.9 MMOL/L (ref 3.7–5.3)
RBC # BLD AUTO: 4.45 M/UL (ref 4.5–5.9)
SODIUM SERPL-SCNC: 135 MMOL/L (ref 135–144)
WBC OTHER # BLD: 8.9 K/UL (ref 3.5–11)

## 2024-03-10 PROCEDURE — 85027 COMPLETE CBC AUTOMATED: CPT

## 2024-03-10 PROCEDURE — 6360000002 HC RX W HCPCS: Performed by: EMERGENCY MEDICINE

## 2024-03-10 PROCEDURE — 6360000002 HC RX W HCPCS: Performed by: STUDENT IN AN ORGANIZED HEALTH CARE EDUCATION/TRAINING PROGRAM

## 2024-03-10 PROCEDURE — 85730 THROMBOPLASTIN TIME PARTIAL: CPT

## 2024-03-10 PROCEDURE — 99222 1ST HOSP IP/OBS MODERATE 55: CPT | Performed by: INTERNAL MEDICINE

## 2024-03-10 PROCEDURE — 83735 ASSAY OF MAGNESIUM: CPT

## 2024-03-10 PROCEDURE — 2580000003 HC RX 258: Performed by: STUDENT IN AN ORGANIZED HEALTH CARE EDUCATION/TRAINING PROGRAM

## 2024-03-10 PROCEDURE — 6370000000 HC RX 637 (ALT 250 FOR IP): Performed by: INTERNAL MEDICINE

## 2024-03-10 PROCEDURE — 80048 BASIC METABOLIC PNL TOTAL CA: CPT

## 2024-03-10 PROCEDURE — 36415 COLL VENOUS BLD VENIPUNCTURE: CPT

## 2024-03-10 PROCEDURE — 93005 ELECTROCARDIOGRAM TRACING: CPT | Performed by: STUDENT IN AN ORGANIZED HEALTH CARE EDUCATION/TRAINING PROGRAM

## 2024-03-10 PROCEDURE — 84100 ASSAY OF PHOSPHORUS: CPT

## 2024-03-10 PROCEDURE — 1210000000 HC MED SURG R&B

## 2024-03-10 PROCEDURE — 71045 X-RAY EXAM CHEST 1 VIEW: CPT

## 2024-03-10 PROCEDURE — 6370000000 HC RX 637 (ALT 250 FOR IP): Performed by: STUDENT IN AN ORGANIZED HEALTH CARE EDUCATION/TRAINING PROGRAM

## 2024-03-10 PROCEDURE — 99232 SBSQ HOSP IP/OBS MODERATE 35: CPT | Performed by: STUDENT IN AN ORGANIZED HEALTH CARE EDUCATION/TRAINING PROGRAM

## 2024-03-10 PROCEDURE — 2500000003 HC RX 250 WO HCPCS: Performed by: NURSE PRACTITIONER

## 2024-03-10 RX ORDER — METOPROLOL TARTRATE 50 MG/1
50 TABLET, FILM COATED ORAL ONCE
Status: COMPLETED | OUTPATIENT
Start: 2024-03-10 | End: 2024-03-10

## 2024-03-10 RX ORDER — METOPROLOL TARTRATE 50 MG/1
50 TABLET, FILM COATED ORAL 2 TIMES DAILY
Status: DISCONTINUED | OUTPATIENT
Start: 2024-03-10 | End: 2024-03-10

## 2024-03-10 RX ORDER — DILTIAZEM HYDROCHLORIDE 5 MG/ML
10 INJECTION INTRAVENOUS ONCE
Status: COMPLETED | OUTPATIENT
Start: 2024-03-10 | End: 2024-03-10

## 2024-03-10 RX ADMIN — FUROSEMIDE 20 MG: 10 INJECTION, SOLUTION INTRAMUSCULAR; INTRAVENOUS at 07:44

## 2024-03-10 RX ADMIN — HEPARIN SODIUM 16 UNITS/KG/HR: 10000 INJECTION, SOLUTION INTRAVENOUS at 06:42

## 2024-03-10 RX ADMIN — METOPROLOL TARTRATE 75 MG: 25 TABLET, FILM COATED ORAL at 20:29

## 2024-03-10 RX ADMIN — METOPROLOL TARTRATE 25 MG: 25 TABLET, FILM COATED ORAL at 07:44

## 2024-03-10 RX ADMIN — ACETAMINOPHEN 650 MG: 325 TABLET ORAL at 23:41

## 2024-03-10 RX ADMIN — METOPROLOL TARTRATE 50 MG: 50 TABLET, FILM COATED ORAL at 08:35

## 2024-03-10 RX ADMIN — HEPARIN SODIUM 16 UNITS/KG/HR: 10000 INJECTION, SOLUTION INTRAVENOUS at 23:49

## 2024-03-10 RX ADMIN — DILTIAZEM HYDROCHLORIDE 10 MG: 5 INJECTION INTRAVENOUS at 04:58

## 2024-03-10 RX ADMIN — SODIUM CHLORIDE, PRESERVATIVE FREE 10 ML: 5 INJECTION INTRAVENOUS at 20:33

## 2024-03-10 RX ADMIN — SODIUM CHLORIDE, PRESERVATIVE FREE 10 ML: 5 INJECTION INTRAVENOUS at 07:44

## 2024-03-10 ASSESSMENT — PAIN DESCRIPTION - LOCATION: LOCATION: HEAD

## 2024-03-10 ASSESSMENT — PAIN SCALES - GENERAL: PAINLEVEL_OUTOF10: 3

## 2024-03-10 ASSESSMENT — PAIN DESCRIPTION - DESCRIPTORS: DESCRIPTORS: DISCOMFORT

## 2024-03-10 NOTE — CONSULTS
[facility]   Consult Note                 Date:   3/10/2024  Date of admission:  3/8/2024  1:32 PM  MRN:   [id number]  YOB: 1965    Reason for Consult: A-fib, chest pain    HISTORY OF PRESENT ILLNESS:    The patient is a 58 y.o.  male who is admitted to the hospital for for shortness of breath weakness and chest pain.  Patient went to urgent care patient was found to very fast rhythm patient was sent to the ER found to be in A-fib with RVR patient was started on Cardizem drip and heparin  Patient has not seen any doctor for sometimes  Patient blood pressure has been on the higher side earlier patient was started on beta-blocker with this admission was increased to 50 twice daily when I saw the blood pressure was still high heart rate was high I increased to 75 twice daily with parameters  Patient due chest pain and pressure with no radiation patient also feels some shortness of breath on activity  Patient has been feeling sick for the last couple of weeks.      Past Medical History:   has no past medical history on file.    Past Surgical History:   has a past surgical history that includes Knee arthroscopy.     Home Medications:    Prior to Admission medications    Not on File       Current Medications: Scheduled Meds:   metoprolol tartrate  50 mg Oral BID    sodium chloride flush  5-40 mL IntraVENous 2 times per day    furosemide  20 mg IntraVENous Daily     Continuous Infusions:   dilTIAZem 125 mg in sodium chloride 0.9 % 125 mL infusion Stopped (03/09/24 1619)    heparin (PORCINE) Infusion 16 Units/kg/hr (03/10/24 0642)    sodium chloride       PRN Meds:.guaiFENesin, heparin (porcine), heparin (porcine), sodium chloride flush, sodium chloride, potassium chloride **OR** potassium alternative oral replacement **OR** potassium chloride, magnesium sulfate, ondansetron **OR** ondansetron, polyethylene glycol, acetaminophen **OR** acetaminophen     Allergies:  Patient has no

## 2024-03-10 NOTE — PLAN OF CARE
Problem: Discharge Planning  Goal: Discharge to home or other facility with appropriate resources  Outcome: Progressing   Patient actively participates in ADLs, and decision making regarding plan of care  Problem: Pain  Goal: Verbalizes/displays adequate comfort level or baseline comfort level  3/10/2024 0336 by Aleisha Horan, RN  Outcome: Progressing   No new signs/symptoms of pain noted, pain rating < 3 on scale of 0-10, pain controlled with medication/ repositioning

## 2024-03-10 NOTE — PLAN OF CARE
Problem: Discharge Planning  Goal: Discharge to home or other facility with appropriate resources  3/10/2024 1612 by Masha Becker RN  Outcome: Progressing  Flowsheets (Taken 3/10/2024 1612)  Discharge to home or other facility with appropriate resources:   Identify barriers to discharge with patient and caregiver   Arrange for needed discharge resources and transportation as appropriate   Identify discharge learning needs (meds, wound care, etc)   Refer to discharge planning if patient needs post-hospital services based on physician order or complex needs related to functional status, cognitive ability or social support system  3/10/2024 0336 by Aleisha Horan RN  Outcome: Progressing     Problem: Pain  Goal: Verbalizes/displays adequate comfort level or baseline comfort level  3/10/2024 1612 by Masha Becker RN  Outcome: Progressing  Flowsheets (Taken 3/10/2024 1612)  Verbalizes/displays adequate comfort level or baseline comfort level:   Encourage patient to monitor pain and request assistance   Assess pain using appropriate pain scale   Administer analgesics based on type and severity of pain and evaluate response   Implement non-pharmacological measures as appropriate and evaluate response   Notify Licensed Independent Practitioner if interventions unsuccessful or patient reports new pain  3/10/2024 0336 by Aleisha Horan RN  Outcome: Progressing     Problem: ABCDS Injury Assessment  Goal: Absence of physical injury  3/10/2024 1612 by Masha Becker RN  Outcome: Progressing  Flowsheets (Taken 3/10/2024 1612)  Absence of Physical Injury: Implement safety measures based on patient assessment  3/10/2024 0336 by Aleisha Horan RN  Outcome: Progressing

## 2024-03-10 NOTE — PROGRESS NOTES
Writer consulted [facility] NP J Calfee-  Pt c/o increasing cough, nonproductive & dry, please advise     Orders received.      @[id number] Writer consulted [facility] NP RAYMOND Calfee-  Pt c/o increasing dyspnea, pt sitting upright in chair on room air and visibly uncomfortable, cough is increasing in frequency since beginning of shift, lungs remain clear diminished, current VS /97 HR 92 SpO2 96% temp 98.2 RR 28, please advise    Awaiting response.      @234 Writer followed up with NP in regard to previous message.    Awaiting response.      @0006 Writer consulted covering physician JOHN Berg-  Pt here for new onset A Fib, pt weaned off cardizem gtt & HR controlled since, heparin gtt remains- pt c/o increasing dyspnea and cough since beginning of shift, robitussin previously ordered & administered with no relief, pt sitting upright in chair visibly uncomfortable, cough is nonproductive & dry, lungs remain clear diminished, please advise on further interventions     @0020 Orders received for CXR & RT eval      @0418 Writer consulted [facility] NP ARUN Travis-  Pt converted back to A Fib RVR, EKG done to confirm, HR ranging 100s-140s, pt c/o SOB, dyspnea, palpitations, pt was on cardizem gtt (turned off yesterday @1630, rate controlled since), heparin gtt remains, VS currently  /103 O2 98% temp 98 RR 22, please advise

## 2024-03-10 NOTE — PROGRESS NOTES
[facility]  Office: [phone]  Carl Cardenas DO, Yonathan Mejia DO, Jalen Benjamin DO, Toni Green DO, Loren Tam MD, Gale Nguyen MD, Daphnie De Guzman MD, Marielle Pérez MD,  Raphael Berg MD, Laz Dooley MD, Brock Antoine MD,  Yudelka Carrillo DO, Urbano Manjarrez MD, Arian Juan MD, Troy Cardenas DO, Livier Dozier MD,  Hernan Montalvo DO, Annamarie Vegas MD, Blanca Rodriguez MD, Fátima Collado MD, Matthew Borrego MD,  Forrest Corbett MD, Yasmany Macias MD, Elvis Mccarty MD, Sridhar Damon MD, Herbie Johnson MD, Gatito He MD, Feliciano Hussein DO, Dwayne Martinez DO, Awa Thapa MD,  Reese Lucero MD, Shirley Waterhouse, CNP,  Ludy Hollis CNP, Yfn Agosto, CNP,  Tasha Mcclain, DNP, Margarita Crouch, CNP, Julissa Hernandez, CNP, Jazmine Gordon CNP, Elana Ferreira CNP, Fani Travis, CNP, Viv Stephens, PA-C, Amanda Robles, PA-C, Genesis Macias, CNP, Allyson Tay, CNP, Elise Swan, CNP, Rebeka Eagle, CNS, Dayanara Levy, CNP, Mildred Grewal, CNP, Tracy Schwab, CNP         [facility]   IN-PATIENT SERVICE   [facility]    Progress Note    3/10/2024    12:50 PM    Name:   Antony Vieyra  MRN:     [id number]     Acct:      [id number]   Room:   [address] [place] [address] Day:  2  Admit Date:  3/8/2024  1:32 PM    PCP:   No primary care provider on file.  Code Status:  Full Code    Subjective:     C/C:   Chief Complaint   Patient presents with    Chest Pain    Shortness of Breath     Interval History Status: improved.     Seen and examined, denies any complaint  Had episode of A-fib RVR overnight, and his heart rates remain uncontrolled will increase metoprolol dose  Cardiology on board recommended stress test and echo, will follow  Vital signs and lab were reviewed  Discussed with RN at bedside    Review of Systems:     Review of Systems   Constitutional: Negative.    HENT: Negative.     Respiratory: Negative.     Cardiovascular: Negative.    Gastrointestinal:

## 2024-03-11 ENCOUNTER — APPOINTMENT (OUTPATIENT)
Dept: NUCLEAR MEDICINE | Age: 59
DRG: 308 | End: 2024-03-11
Payer: COMMERCIAL

## 2024-03-11 ENCOUNTER — APPOINTMENT (OUTPATIENT)
Age: 59
DRG: 308 | End: 2024-03-11
Payer: COMMERCIAL

## 2024-03-11 ENCOUNTER — APPOINTMENT (OUTPATIENT)
Age: 59
DRG: 308 | End: 2024-03-11
Attending: EMERGENCY MEDICINE
Payer: COMMERCIAL

## 2024-03-11 LAB
ECHO AO ROOT DIAM: 3.8 CM
ECHO AO ROOT INDEX: 1.82 CM/M2
ECHO AV AREA PEAK VELOCITY: 2.4 CM2
ECHO AV AREA/BSA PEAK VELOCITY: 1.1 CM2/M2
ECHO AV PEAK GRADIENT: 3 MMHG
ECHO AV PEAK VELOCITY: 0.8 M/S
ECHO AV VELOCITY RATIO: 0.75
ECHO BSA: 2.14 M2
ECHO BSA: 2.15 M2
ECHO EST RA PRESSURE: 3 MMHG
ECHO IVC EXP: 1.7 CM
ECHO LA AREA 2C: 26.5 CM2
ECHO LA AREA 4C: 27.5 CM2
ECHO LA DIAMETER INDEX: 1.67 CM/M2
ECHO LA DIAMETER: 3.5 CM
ECHO LA MAJOR AXIS: 6.4 CM
ECHO LA MINOR AXIS: 6.4 CM
ECHO LA TO AORTIC ROOT RATIO: 0.92
ECHO LA VOL BP: 93 ML (ref 18–58)
ECHO LA VOL MOD A2C: 91 ML (ref 18–58)
ECHO LA VOL MOD A4C: 97 ML (ref 18–58)
ECHO LA VOL/BSA BIPLANE: 44 ML/M2 (ref 16–34)
ECHO LA VOLUME INDEX MOD A2C: 44 ML/M2 (ref 16–34)
ECHO LA VOLUME INDEX MOD A4C: 46 ML/M2 (ref 16–34)
ECHO LV EDV A2C: 140 ML
ECHO LV EDV A4C: 162 ML
ECHO LV EDV INDEX A4C: 78 ML/M2
ECHO LV EDV NDEX A2C: 67 ML/M2
ECHO LV EJECTION FRACTION A2C: 16 %
ECHO LV EJECTION FRACTION A4C: 22 %
ECHO LV EJECTION FRACTION BIPLANE: 20 % (ref 55–100)
ECHO LV ESV A2C: 118 ML
ECHO LV ESV A4C: 126 ML
ECHO LV ESV INDEX A2C: 56 ML/M2
ECHO LV ESV INDEX A4C: 60 ML/M2
ECHO LV FRACTIONAL SHORTENING: 7 % (ref 28–44)
ECHO LV INTERNAL DIMENSION DIASTOLE INDEX: 2.82 CM/M2
ECHO LV INTERNAL DIMENSION DIASTOLIC: 5.9 CM (ref 4.2–5.9)
ECHO LV INTERNAL DIMENSION SYSTOLIC INDEX: 2.63 CM/M2
ECHO LV INTERNAL DIMENSION SYSTOLIC: 5.5 CM
ECHO LV IVSD: 1.2 CM (ref 0.6–1)
ECHO LV MASS 2D: 288.5 G (ref 88–224)
ECHO LV MASS INDEX 2D: 138 G/M2 (ref 49–115)
ECHO LV POSTERIOR WALL DIASTOLIC: 1.1 CM (ref 0.6–1)
ECHO LV RELATIVE WALL THICKNESS RATIO: 0.37
ECHO LVOT AREA: 3.1 CM2
ECHO LVOT DIAM: 2 CM
ECHO LVOT PEAK GRADIENT: 2 MMHG
ECHO LVOT PEAK VELOCITY: 0.6 M/S
ECHO RIGHT VENTRICULAR SYSTOLIC PRESSURE (RVSP): 21 MMHG
ECHO RV TAPSE: 1.9 CM (ref 1.7–?)
ECHO TV PEAK GRADIENT: 16 MMHG
ECHO TV REGURGITANT MAX VELOCITY: 2.1 M/S
ECHO TV REGURGITANT PEAK GRADIENT: 18 MMHG
EKG ATRIAL RATE: 131 BPM
EKG Q-T INTERVAL: 354 MS
EKG QRS DURATION: 84 MS
EKG QTC CALCULATION (BAZETT): 487 MS
EKG R AXIS: 67 DEGREES
EKG T AXIS: 24 DEGREES
EKG VENTRICULAR RATE: 114 BPM
NUC STRESS EJECTION FRACTION: 27 %
PARTIAL THROMBOPLASTIN TIME: 66.8 SEC (ref 21.3–31.3)
STRESS BASELINE DIAS BP: 109 MMHG
STRESS BASELINE HR: 97 BPM
STRESS BASELINE SYS BP: 152 MMHG
STRESS ESTIMATED WORKLOAD: 1 METS
STRESS PEAK DIAS BP: 104 MMHG
STRESS PEAK SYS BP: 157 MMHG
STRESS PERCENT HR ACHIEVED: 85 %
STRESS POST PEAK HR: 137 BPM
STRESS RATE PRESSURE PRODUCT: NORMAL BPM*MMHG
STRESS TARGET HR: 162 BPM
TID: 0.95

## 2024-03-11 PROCEDURE — 6360000002 HC RX W HCPCS: Performed by: EMERGENCY MEDICINE

## 2024-03-11 PROCEDURE — 78452 HT MUSCLE IMAGE SPECT MULT: CPT

## 2024-03-11 PROCEDURE — 1210000000 HC MED SURG R&B

## 2024-03-11 PROCEDURE — 36415 COLL VENOUS BLD VENIPUNCTURE: CPT

## 2024-03-11 PROCEDURE — 6360000004 HC RX CONTRAST MEDICATION: Performed by: STUDENT IN AN ORGANIZED HEALTH CARE EDUCATION/TRAINING PROGRAM

## 2024-03-11 PROCEDURE — 93016 CV STRESS TEST SUPVJ ONLY: CPT | Performed by: RADIOLOGY

## 2024-03-11 PROCEDURE — 6370000000 HC RX 637 (ALT 250 FOR IP): Performed by: NURSE PRACTITIONER

## 2024-03-11 PROCEDURE — C8929 TTE W OR WO FOL WCON,DOPPLER: HCPCS

## 2024-03-11 PROCEDURE — 6370000000 HC RX 637 (ALT 250 FOR IP): Performed by: INTERNAL MEDICINE

## 2024-03-11 PROCEDURE — 6360000002 HC RX W HCPCS: Performed by: STUDENT IN AN ORGANIZED HEALTH CARE EDUCATION/TRAINING PROGRAM

## 2024-03-11 PROCEDURE — A9500 TC99M SESTAMIBI: HCPCS | Performed by: STUDENT IN AN ORGANIZED HEALTH CARE EDUCATION/TRAINING PROGRAM

## 2024-03-11 PROCEDURE — 93017 CV STRESS TEST TRACING ONLY: CPT

## 2024-03-11 PROCEDURE — 3430000000 HC RX DIAGNOSTIC RADIOPHARMACEUTICAL: Performed by: STUDENT IN AN ORGANIZED HEALTH CARE EDUCATION/TRAINING PROGRAM

## 2024-03-11 PROCEDURE — 99233 SBSQ HOSP IP/OBS HIGH 50: CPT | Performed by: INTERNAL MEDICINE

## 2024-03-11 PROCEDURE — 85730 THROMBOPLASTIN TIME PARTIAL: CPT

## 2024-03-11 PROCEDURE — 94760 N-INVAS EAR/PLS OXIMETRY 1: CPT

## 2024-03-11 PROCEDURE — 2580000003 HC RX 258: Performed by: STUDENT IN AN ORGANIZED HEALTH CARE EDUCATION/TRAINING PROGRAM

## 2024-03-11 PROCEDURE — 99232 SBSQ HOSP IP/OBS MODERATE 35: CPT | Performed by: STUDENT IN AN ORGANIZED HEALTH CARE EDUCATION/TRAINING PROGRAM

## 2024-03-11 PROCEDURE — 93306 TTE W/DOPPLER COMPLETE: CPT | Performed by: INTERNAL MEDICINE

## 2024-03-11 RX ORDER — SODIUM CHLORIDE 0.9 % (FLUSH) 0.9 %
10 SYRINGE (ML) INJECTION ONCE
Status: COMPLETED | OUTPATIENT
Start: 2024-03-11 | End: 2024-03-11

## 2024-03-11 RX ORDER — SODIUM CHLORIDE 9 MG/ML
500 INJECTION, SOLUTION INTRAVENOUS CONTINUOUS PRN
Status: ACTIVE | OUTPATIENT
Start: 2024-03-11 | End: 2024-03-11

## 2024-03-11 RX ORDER — METOPROLOL TARTRATE 1 MG/ML
5 INJECTION, SOLUTION INTRAVENOUS EVERY 5 MIN PRN
Status: ACTIVE | OUTPATIENT
Start: 2024-03-11 | End: 2024-03-11

## 2024-03-11 RX ORDER — TETRAKIS(2-METHOXYISOBUTYLISOCYANIDE)COPPER(I) TETRAFLUOROBORATE 1 MG/ML
15 INJECTION, POWDER, LYOPHILIZED, FOR SOLUTION INTRAVENOUS
Status: COMPLETED | OUTPATIENT
Start: 2024-03-11 | End: 2024-03-11

## 2024-03-11 RX ORDER — TETRAKIS(2-METHOXYISOBUTYLISOCYANIDE)COPPER(I) TETRAFLUOROBORATE 1 MG/ML
35 INJECTION, POWDER, LYOPHILIZED, FOR SOLUTION INTRAVENOUS
Status: COMPLETED | OUTPATIENT
Start: 2024-03-11 | End: 2024-03-11

## 2024-03-11 RX ORDER — AMINOPHYLLINE 25 MG/ML
50 INJECTION, SOLUTION INTRAVENOUS PRN
Status: ACTIVE | OUTPATIENT
Start: 2024-03-11 | End: 2024-03-11

## 2024-03-11 RX ORDER — NITROGLYCERIN 0.4 MG/1
0.4 TABLET SUBLINGUAL EVERY 5 MIN PRN
Status: ACTIVE | OUTPATIENT
Start: 2024-03-11 | End: 2024-03-11

## 2024-03-11 RX ORDER — ATROPINE SULFATE 0.1 MG/ML
0.5 INJECTION INTRAVENOUS EVERY 5 MIN PRN
Status: ACTIVE | OUTPATIENT
Start: 2024-03-11 | End: 2024-03-11

## 2024-03-11 RX ORDER — REGADENOSON 0.08 MG/ML
0.4 INJECTION, SOLUTION INTRAVENOUS
Status: COMPLETED | OUTPATIENT
Start: 2024-03-11 | End: 2024-03-11

## 2024-03-11 RX ORDER — ALBUTEROL SULFATE 90 UG/1
2 AEROSOL, METERED RESPIRATORY (INHALATION) PRN
Status: DISCONTINUED | OUTPATIENT
Start: 2024-03-11 | End: 2024-03-11

## 2024-03-11 RX ORDER — METOPROLOL TARTRATE 50 MG/1
100 TABLET, FILM COATED ORAL 2 TIMES DAILY
Status: DISCONTINUED | OUTPATIENT
Start: 2024-03-11 | End: 2024-03-12 | Stop reason: HOSPADM

## 2024-03-11 RX ORDER — SODIUM CHLORIDE 0.9 % (FLUSH) 0.9 %
5-40 SYRINGE (ML) INJECTION PRN
Status: ACTIVE | OUTPATIENT
Start: 2024-03-11 | End: 2024-03-11

## 2024-03-11 RX ADMIN — SACUBITRIL AND VALSARTAN 0.5 TABLET: 24; 26 TABLET, FILM COATED ORAL at 21:12

## 2024-03-11 RX ADMIN — FUROSEMIDE 20 MG: 10 INJECTION, SOLUTION INTRAMUSCULAR; INTRAVENOUS at 12:08

## 2024-03-11 RX ADMIN — METOPROLOL TARTRATE 100 MG: 50 TABLET, FILM COATED ORAL at 21:12

## 2024-03-11 RX ADMIN — SODIUM CHLORIDE, PRESERVATIVE FREE 10 ML: 5 INJECTION INTRAVENOUS at 08:15

## 2024-03-11 RX ADMIN — PERFLUTREN 1 ML: 6.52 INJECTION, SUSPENSION INTRAVENOUS at 13:21

## 2024-03-11 RX ADMIN — EMPAGLIFLOZIN 10 MG: 10 TABLET, FILM COATED ORAL at 17:06

## 2024-03-11 RX ADMIN — TETRAKIS(2-METHOXYISOBUTYLISOCYANIDE)COPPER(I) TETRAFLUOROBORATE 41.5 MILLICURIE: 1 INJECTION, POWDER, LYOPHILIZED, FOR SOLUTION INTRAVENOUS at 10:54

## 2024-03-11 RX ADMIN — TETRAKIS(2-METHOXYISOBUTYLISOCYANIDE)COPPER(I) TETRAFLUOROBORATE 15 MILLICURIE: 1 INJECTION, POWDER, LYOPHILIZED, FOR SOLUTION INTRAVENOUS at 08:26

## 2024-03-11 RX ADMIN — METOPROLOL TARTRATE 75 MG: 25 TABLET, FILM COATED ORAL at 12:08

## 2024-03-11 RX ADMIN — REGADENOSON 0.4 MG: 0.08 INJECTION, SOLUTION INTRAVENOUS at 10:56

## 2024-03-11 RX ADMIN — DILTIAZEM HYDROCHLORIDE 30 MG: 30 TABLET, FILM COATED ORAL at 12:08

## 2024-03-11 RX ADMIN — SODIUM CHLORIDE, PRESERVATIVE FREE 10 ML: 5 INJECTION INTRAVENOUS at 10:54

## 2024-03-11 RX ADMIN — HEPARIN SODIUM 16 UNITS/KG/HR: 10000 INJECTION, SOLUTION INTRAVENOUS at 13:41

## 2024-03-11 ASSESSMENT — ENCOUNTER SYMPTOMS
RESPIRATORY NEGATIVE: 1
GASTROINTESTINAL NEGATIVE: 1

## 2024-03-11 ASSESSMENT — PAIN SCALES - GENERAL
PAINLEVEL_OUTOF10: 2
PAINLEVEL_OUTOF10: 2

## 2024-03-11 NOTE — PROGRESS NOTES
Am assessment and vitals complete, pt denies any pain, pt NPO for stress test, heparin continues at 16units/kg/hr, ptt 66.8 this am, next ptt in am, no needs voiced, will continue to monitor

## 2024-03-11 NOTE — RT PROTOCOL NOTE
RT Inhaler-Nebulizer Bronchodilator Protocol Note    There is a bronchodilator order in the chart from a provider indicating to follow the RT Bronchodilator Protocol and there is an “Initiate RT Inhaler-Nebulizer Bronchodilator Protocol” order as well (see protocol at bottom of note).    CXR Findings:  XR CHEST PORTABLE    Result Date: 3/10/2024  Ill-defined airspace opacity in the right lung base suggesting pneumonia.       The findings from the last RT Protocol Assessment were as follows:   History Pulmonary Disease: None or smoker <15 pack years  Respiratory Pattern: Regular pattern and RR 12-20 bpm  Breath Sounds: Clear breath sounds  Cough: Strong, spontaneous, non-productive  Indication for Bronchodilator Therapy:    Bronchodilator Assessment Score: 0    Aerosolized bronchodilator medication orders have been revised according to the RT Inhaler-Nebulizer Bronchodilator Protocol below.    Respiratory Therapist to perform RT Therapy Protocol Assessment initially then follow the protocol.  Repeat RT Therapy Protocol Assessment PRN for score 0-3 or on second treatment, BID, and PRN for scores above 3.    No Indications - adjust the frequency to every 6 hours PRN wheezing or bronchospasm, if no treatments needed after 48 hours then discontinue using Per Protocol order mode.     If indication present, adjust the RT bronchodilator orders based on the Bronchodilator Assessment Score as indicated below.  Use Inhaler orders unless patient has one or more of the following: on home nebulizer, not able to hold breath for 10 seconds, is not alert and oriented, cannot activate and use MDI correctly, or respiratory rate 25 breaths per minute or more, then use the equivalent nebulizer order(s) with same Frequency and PRN reasons based on the score.  If a patient is on this medication at home then do not decrease Frequency below that used at home.    0-3 - enter or revise RT bronchodilator order(s) to equivalent RT

## 2024-03-11 NOTE — PLAN OF CARE
Pt admitted for new onset afib and pulonary congestion, pt now on po cardizem for afib, pt continues on heparin at 16unitss/kg/hr, pt will go home on eliquis or xarelto, pt continues on IV lasix, pt has stress test and echo today, EF 20-25%, vss, other treatments continue, potential discharge tomorrow     Problem: Discharge Planning  Goal: Discharge to home or other facility with appropriate resources  3/11/2024 1654 by Elana Henry, RN  Outcome: Progressing

## 2024-03-11 NOTE — PROGRESS NOTES
edema.   Lymphadenopathy:      Cervical: No cervical adenopathy.   Skin:     Coloration: Skin is not jaundiced or pale.      Findings: No lesion or rash.   Neurological:      General: No focal deficit present.      Mental Status: He is alert and oriented to person, place, and time.      Sensory: No sensory deficit.      Motor: No weakness.   Psychiatric:         Mood and Affect: Mood normal.         Assessment:     Hospital Problems             Last Modified POA    * (Principal) A-fib ([facility]) 3/8/2024 Yes    Atrial flutter with rapid ventricular response ([facility]) 3/10/2024 Yes    Uncontrolled hypertension 3/10/2024 Yes     Plan:     New onset of A-fib-rate control, heparin drip, monitor on telemetry, correct electrolyte abnormality to keep potassium above 4 and magnesium above 2  New onset of congestive heart failure with reduced ejection fraction will continue with metoprolol and Cardizem, cardiology is following will follow recommendation, continue IV Lasix monitor urine output and daily weight  DVT prophylaxis on heparin  Hypokalemia resolved  Disposition  remain in the hospital for IV diuresis and pending final cardiology recommendation     Sridhar Damon MD  3/11/2024  3:51 PM

## 2024-03-11 NOTE — PLAN OF CARE
Problem: Discharge Planning  Goal: Discharge to home or other facility with appropriate resources  3/11/2024 0332 by Aleisha Horan RN  Outcome: Progressing   Patient actively participates in ADLs, and decision making regarding plan of care  Problem: Pain  Goal: Verbalizes/displays adequate comfort level or baseline comfort level  3/11/2024 0332 by Aleisha Horan RN  Outcome: Progressing   No new signs/symptoms of pain noted, pain rating < 3 on scale of 0-10, pain controlled with medication/ repositioning  Problem: ABCDS Injury Assessment  Goal: Absence of physical injury  3/11/2024 0332 by Aleisha Horan RN  Outcome: Progressing   No falls/ injuries this shift, bed in lowest position, brakes on, bed alarm on, call light in reach, side rails up x2

## 2024-03-11 NOTE — PROGRESS NOTES
[facility]   Progress Note                   Date:   3/11/2024  Patient name: Antony Vieyra  Date of admission:  3/8/2024  1:32 PM  MRN:   [id number]  YOB: 1965  PCP: No primary care provider on file.    Reason for Admission:     Subjective:       Clinical Changes / Abnormalities:  No cp  No sob  Tele- AF       Medications:   Scheduled Meds:   Current Facility-Administered Medications:     metoprolol tartrate (LOPRESSOR) tablet 75 mg, 75 mg, Oral, BID, Meek Escobar MD, 75 mg at 03/10/24 2029    guaiFENesin (ROBITUSSIN) 100 MG/5ML liquid 200 mg, 200 mg, Oral, Q4H PRN, Mildred Grewal APRN - CNP, 200 mg at 03/09/24 2136    dilTIAZem 125 mg in sodium chloride 0.9 % 125 mL infusion, 2.5-15 mg/hr, IntraVENous, Continuous, Elena Fung, DO, Stopped at 03/09/24 1619    heparin (porcine) injection 4,000 Units, 4,000 Units, IntraVENous, PRN, Elena Fung, DO    heparin (porcine) injection 2,000 Units, 2,000 Units, IntraVENous, PRN, Elena Fung, DO, 2,000 Units at 03/09/24 1624    heparin 25,000 units in dextrose 5% 250 mL (premix) infusion, 5-30 Units/kg/hr, IntraVENous, Continuous, Elena Fung, DO, Last Rate: 15.4 mL/hr at 03/10/24 2349, 16 Units/kg/hr at 03/10/24 2349    sodium chloride flush 0.9 % injection 5-40 mL, 5-40 mL, IntraVENous, 2 times per day, Sridhar Damon MD, 10 mL at 03/10/24 2033    sodium chloride flush 0.9 % injection 5-40 mL, 5-40 mL, IntraVENous, PRN, Sridhar Damon MD    0.9 % sodium chloride infusion, , IntraVENous, PRN, Sridhar Damon MD    potassium chloride (KLOR-CON M) extended release tablet 40 mEq, 40 mEq, Oral, PRN **OR** potassium bicarb-citric acid (EFFER-K) effervescent tablet 40 mEq, 40 mEq, Oral, PRN **OR** potassium chloride 10 mEq/100 mL IVPB (Peripheral Line), 10 mEq, IntraVENous, PRN, Sridhar Damon MD    magnesium sulfate 2000 mg in 50 mL IVPB premix, 2,000 mg, IntraVENous,  tenderness/mass/nodules  Lungs: clear to auscultation bilaterally  Heart: regular rate and rhythm, S1, S2 normal, no murmur, click, rub or gallop  Abdomen: soft, non-tender; bowel sounds normal; no masses,  no organomegaly  Extremities: extremities normal, atraumatic, no cyanosis or edema  Neurologic: Mental status: Alert, oriented, thought content appropriate    EKG:   Results for orders placed or performed during the hospital encounter of 03/08/24   EKG 12 Lead   Result Value Ref Range    Ventricular Rate 121 BPM    Atrial Rate 330 BPM    QRS Duration 84 ms    Q-T Interval 358 ms    QTc Calculation (Bazett) 508 ms    P Axis 54 degrees    R Axis 68 degrees    T Axis 47 degrees    Narrative    Atrial flutter with variable A-V block  Nonspecific T wave abnormality  Abnormal ECG  No previous ECGs available           Assessment / Acute Cardiac Problems:   Patient Active Problem List:     New PAF/AFL still with some RVR  HTN - still higher  Hypokalemia   SOB on admission     Plan of Treatment:   Add cardizem - change to long acting on d/c.   On higher dose lopressor  Check 2 echo  Stress test pending today  Change to xarelto on d/c    Dispo: await stress/echo, if both low risk, okay for d/c on xarelto. With plan for follow up in 4 weeks. If remains in AFib/AFL- plan for CV.     D/w patient in detail.     Thank you for allowing me to participate in the care of this patient, please do not hesitate to call if you have any questions.    Maxwell Muñoz DO, FACC, RPVI, WILL, OSVALDO  [facility]  [facility]  [phone]

## 2024-03-11 NOTE — CARE COORDINATION
Case Management Assessment  Initial Evaluation    Date/Time of Evaluation: 3/11/2024 4:32 PM  Assessment Completed by: WILLIE Oconnor, KASSIE    If patient is discharged prior to next notation, then this note serves as note for discharge by case management.    Patient Name: Antony Vieyra                   YOB: 1965  Diagnosis: Pulmonary congestion [R09.89]  Hypokalemia [E87.6]  A-fib (HCC) [I48.91]  Atrial flutter with rapid ventricular response (HCC) [I48.92]  Elevated brain natriuretic peptide (BNP) level [R79.89]                   Date / Time: 3/8/2024  1:32 PM    Patient Admission Status: Inpatient   Readmission Risk (Low < 19, Mod (19-27), High > 27): Readmission Risk Score: 4.8    Current PCP: No primary care provider on file.  PCP verified by CM? Yes    Chart Reviewed: Yes      History Provided by: Patient  Patient Orientation: Alert and Oriented    Patient Cognition: Alert    Hospitalization in the last 30 days (Readmission):  No    If yes, Readmission Assessment in CM Navigator will be completed.    Advance Directives:      Code Status: Full Code   Patient's Primary Decision Maker is: Legal Next of Kin      Discharge Planning:    Patient lives with: Children Type of Home: Apartment  Primary Care Giver: Self  Patient Support Systems include: Children, Family Members   Current Financial resources:    Current community resources: None  Current services prior to admission: None            Current DME:              Type of Home Care services:  None    ADLS  Prior functional level: Independent in ADLs/IADLs  Current functional level: Independent in ADLs/IADLs    PT AM-PAC:   /24  OT AM-PAC:   /24    Family can provide assistance at DC: No  Would you like Case Management to discuss the discharge plan with any other family members/significant others, and if so, who? No  Plans to Return to Present Housing: Yes  Other Identified Issues/Barriers to RETURNING to current housing: None  Potential

## 2024-03-11 NOTE — CARE COORDINATION
Writer attempted to see patient for CM assessment. Patient was out of room at stress test. Will attempt again as time allows

## 2024-03-11 NOTE — PROGRESS NOTES
Echo and stress test results came back, md spoke with Dr. Muñoz and pt will stay another night for medications adjustments and hopefully be discharged tomorrow

## 2024-03-12 VITALS
BODY MASS INDEX: 32.08 KG/M2 | HEART RATE: 89 BPM | WEIGHT: 211.64 LBS | HEIGHT: 68 IN | SYSTOLIC BLOOD PRESSURE: 123 MMHG | TEMPERATURE: 98.2 F | RESPIRATION RATE: 18 BRPM | OXYGEN SATURATION: 94 % | DIASTOLIC BLOOD PRESSURE: 98 MMHG

## 2024-03-12 LAB
ANION GAP SERPL CALCULATED.3IONS-SCNC: 11 MMOL/L (ref 9–17)
BUN SERPL-MCNC: 13 MG/DL (ref 6–20)
CALCIUM SERPL-MCNC: 8.5 MG/DL (ref 8.6–10.4)
CHLORIDE SERPL-SCNC: 105 MMOL/L (ref 98–107)
CO2 SERPL-SCNC: 23 MMOL/L (ref 20–31)
CREAT SERPL-MCNC: 1.1 MG/DL (ref 0.7–1.2)
ERYTHROCYTE [DISTWIDTH] IN BLOOD BY AUTOMATED COUNT: 14.3 % (ref 12.5–15.4)
GFR SERPL CREATININE-BSD FRML MDRD: >60 ML/MIN/1.73M2
GLUCOSE SERPL-MCNC: 131 MG/DL (ref 70–99)
HCT VFR BLD AUTO: 39.8 % (ref 41–53)
HGB BLD-MCNC: 13.6 G/DL (ref 13.5–17.5)
MCH RBC QN AUTO: 31.2 PG (ref 26–34)
MCHC RBC AUTO-ENTMCNC: 34.3 G/DL (ref 31–37)
MCV RBC AUTO: 91.1 FL (ref 80–100)
PARTIAL THROMBOPLASTIN TIME: 99.5 SEC (ref 21.3–31.3)
PLATELET # BLD AUTO: 232 K/UL (ref 140–450)
PMV BLD AUTO: 8.5 FL (ref 6–12)
POTASSIUM SERPL-SCNC: 3.7 MMOL/L (ref 3.7–5.3)
RBC # BLD AUTO: 4.37 M/UL (ref 4.5–5.9)
SODIUM SERPL-SCNC: 139 MMOL/L (ref 135–144)
WBC OTHER # BLD: 6.8 K/UL (ref 3.5–11)

## 2024-03-12 PROCEDURE — 36415 COLL VENOUS BLD VENIPUNCTURE: CPT

## 2024-03-12 PROCEDURE — 85730 THROMBOPLASTIN TIME PARTIAL: CPT

## 2024-03-12 PROCEDURE — 6370000000 HC RX 637 (ALT 250 FOR IP): Performed by: NURSE PRACTITIONER

## 2024-03-12 PROCEDURE — 6370000000 HC RX 637 (ALT 250 FOR IP): Performed by: HOSPITALIST

## 2024-03-12 PROCEDURE — 80048 BASIC METABOLIC PNL TOTAL CA: CPT

## 2024-03-12 PROCEDURE — 99233 SBSQ HOSP IP/OBS HIGH 50: CPT | Performed by: NURSE PRACTITIONER

## 2024-03-12 PROCEDURE — 6360000002 HC RX W HCPCS: Performed by: EMERGENCY MEDICINE

## 2024-03-12 PROCEDURE — 2580000003 HC RX 258: Performed by: STUDENT IN AN ORGANIZED HEALTH CARE EDUCATION/TRAINING PROGRAM

## 2024-03-12 PROCEDURE — 99232 SBSQ HOSP IP/OBS MODERATE 35: CPT | Performed by: HOSPITALIST

## 2024-03-12 PROCEDURE — 85027 COMPLETE CBC AUTOMATED: CPT

## 2024-03-12 RX ORDER — FUROSEMIDE 20 MG/1
20 TABLET ORAL DAILY
Status: DISCONTINUED | OUTPATIENT
Start: 2024-03-12 | End: 2024-03-12

## 2024-03-12 RX ORDER — FUROSEMIDE 20 MG/1
20 TABLET ORAL DAILY
Qty: 90 TABLET | Refills: 3 | Status: SHIPPED | OUTPATIENT
Start: 2024-03-12

## 2024-03-12 RX ORDER — FUROSEMIDE 40 MG/1
40 TABLET ORAL DAILY
Qty: 60 TABLET | Refills: 3 | Status: SHIPPED | OUTPATIENT
Start: 2024-03-12 | End: 2024-03-12

## 2024-03-12 RX ORDER — METOPROLOL TARTRATE 100 MG/1
100 TABLET ORAL 2 TIMES DAILY
Qty: 180 TABLET | Refills: 3 | Status: SHIPPED | OUTPATIENT
Start: 2024-03-12 | End: 2024-03-12

## 2024-03-12 RX ORDER — METOPROLOL TARTRATE 100 MG/1
100 TABLET ORAL 2 TIMES DAILY
Qty: 180 TABLET | Refills: 3 | Status: SHIPPED | OUTPATIENT
Start: 2024-03-12

## 2024-03-12 RX ADMIN — EMPAGLIFLOZIN 10 MG: 10 TABLET, FILM COATED ORAL at 08:22

## 2024-03-12 RX ADMIN — HEPARIN SODIUM 16 UNITS/KG/HR: 10000 INJECTION, SOLUTION INTRAVENOUS at 06:07

## 2024-03-12 RX ADMIN — METOPROLOL TARTRATE 100 MG: 50 TABLET, FILM COATED ORAL at 08:22

## 2024-03-12 RX ADMIN — SODIUM CHLORIDE, PRESERVATIVE FREE 10 ML: 5 INJECTION INTRAVENOUS at 08:30

## 2024-03-12 RX ADMIN — SACUBITRIL AND VALSARTAN 0.5 TABLET: 24; 26 TABLET, FILM COATED ORAL at 08:22

## 2024-03-12 RX ADMIN — APIXABAN 5 MG: 5 TABLET, FILM COATED ORAL at 10:52

## 2024-03-12 NOTE — DISCHARGE SUMMARY
3/10/2024  Ill-defined airspace opacity in the right lung base suggesting pneumonia.     XR CHEST PORTABLE    Result Date: 3/8/2024  Mild perihilar and bibasilar edema.  Superimposed right basilar infiltrate not excluded.       Consultations:    Consults:     Final Specialist Recommendations/Findings:   IP CONSULT TO HOSPITALIST  IP CONSULT TO CARDIOLOGY      The patient was seen and examined on day of discharge and this discharge summary is in conjunction with any daily progress note from day of discharge.    Discharge plan:     Disposition: Home    Physician Follow Up:   Meek Escobar MD  [id number]  [place] [id number] [phone]    Follow up on 3/28/2024  at 1 pm with CNP       Requiring Further Evaluation/Follow Up POST HOSPITALIZATION/Incidental Findings: Follow-up with cardiology as instructed    Diet: regular diet    Activity: As tolerated    Instructions to Patient: None    Discharge Medications:      Medication List        START taking these medications      apixaban 5 MG Tabs tablet  Commonly known as: ELIQUIS  Take 1 tablet by mouth 2 times daily     empagliflozin 10 MG tablet  Commonly known as: JARDIANCE  Take 1 tablet by mouth daily     furosemide 20 MG tablet  Commonly known as: Lasix  Take 1 tablet by mouth daily     metoprolol 100 MG tablet  Commonly known as: LOPRESSOR  Take 1 tablet by mouth 2 times daily     sacubitril-valsartan 24-26 MG per tablet  Commonly known as: ENTRESTO  Take 0.5 tablets by mouth 2 times daily               Where to Get Your Medications        These medications were sent to [id number] - [place], [place] - [address] -  [phone] - F [phone]  [address] [address]      Phone: [phone]   apixaban 5 MG Tabs tablet  empagliflozin 10 MG tablet  furosemide 20 MG tablet  metoprolol 100 MG tablet  sacubitril-valsartan 24-26 MG per tablet         Time spent on discharge is 20 mins in patient examination, evaluation,  counseling as well as medication reconciliation, prescriptions for required medications, discharge plan and follow up.    Electronically signed by   Troy Cardenas DO  3/12/2024  11:32 AM      Thank you Dr. Puentes primary care provider on file. for the opportunity to be involved in this patient's care.

## 2024-03-12 NOTE — PROGRESS NOTES
[facility]   Progress Note                   Date:   3/12/2024  Patient name: Antony Vieyra  Date of admission:  3/8/2024  1:32 PM  MRN:   [id number]  YOB: 1965  PCP: No primary care provider on file.    Reason for Admission:     Subjective:       Clinical Changes / Abnormalities:   Pt seen and examined in the room.  Pt denies any CP or sob.  Remains afib, rate controlled.        Medications:   Scheduled Meds:   Current Facility-Administered Medications:     metoprolol tartrate (LOPRESSOR) tablet 100 mg, 100 mg, Oral, BID, Alpesh, Deisy E, APRN - CNP, 100 mg at 03/11/24 2112    empagliflozin (JARDIANCE) tablet 10 mg, 10 mg, Oral, Daily, Alpesh, Deisy E, APRN - CNP, 10 mg at 03/11/24 1706    sacubitril-valsartan (ENTRESTO) 24-26 MG per tablet 0.5 tablet, 0.5 tablet, Oral, BID, Alpesh, Deisy E, APRN - CNP, 0.5 tablet at 03/11/24 2112    guaiFENesin (ROBITUSSIN) 100 MG/5ML liquid 200 mg, 200 mg, Oral, Q4H PRN, Mildred Grewal APRN - CNP, 200 mg at 03/09/24 2136    heparin (porcine) injection 4,000 Units, 4,000 Units, IntraVENous, PRN, Elena Fung,     heparin (porcine) injection 2,000 Units, 2,000 Units, IntraVENous, PRN, Elena Fung DO, 2,000 Units at 03/09/24 1624    heparin 25,000 units in dextrose 5% 250 mL (premix) infusion, 5-30 Units/kg/hr, IntraVENous, Continuous, Elena Fung DO, Last Rate: 15.4 mL/hr at 03/12/24 0607, 16 Units/kg/hr at 03/12/24 0607    sodium chloride flush 0.9 % injection 5-40 mL, 5-40 mL, IntraVENous, 2 times per day, Sridhar Damon MD, 10 mL at 03/11/24 0815    sodium chloride flush 0.9 % injection 5-40 mL, 5-40 mL, IntraVENous, PRN, Sridhar Damon MD    0.9 % sodium chloride infusion, , IntraVENous, PRN, Sridhar Damon MD    potassium chloride (KLOR-CON M) extended release tablet 40 mEq, 40 mEq, Oral, PRN **OR** potassium bicarb-citric acid (EFFER-K) effervescent tablet 40 mEq, 40

## 2024-03-12 NOTE — PLAN OF CARE
Problem: Discharge Planning  Goal: Discharge to home or other facility with appropriate resources  3/12/2024 0554 by Genevieve Dooley, RN  Outcome: Progressing  Flowsheets (Taken 3/11/2024 2020)  Discharge to home or other facility with appropriate resources:   Identify barriers to discharge with patient and caregiver   Arrange for needed discharge resources and transportation as appropriate   Identify discharge learning needs (meds, wound care, etc)   Arrange for interpreters to assist at discharge as needed   Refer to discharge planning if patient needs post-hospital services based on physician order or complex needs related to functional status, cognitive ability or social support system  3/11/2024 1654 by Elana Henry, RN  Outcome: Progressing     Problem: Pain  Goal: Verbalizes/displays adequate comfort level or baseline comfort level  Outcome: Progressing     Problem: ABCDS Injury Assessment  Goal: Absence of physical injury  Outcome: Progressing

## 2024-03-12 NOTE — PLAN OF CARE
Problem: Discharge Planning  Goal: Discharge to home or other facility with appropriate resources  3/12/2024 1809 by Anitha Yan RN  Outcome: Adequate for Discharge  Flowsheets (Taken 3/12/2024 0833)  Discharge to home or other facility with appropriate resources:   Identify barriers to discharge with patient and caregiver   Identify discharge learning needs (meds, wound care, etc)   Arrange for needed discharge resources and transportation as appropriate   Arrange for interpreters to assist at discharge as needed   Refer to discharge planning if patient needs post-hospital services based on physician order or complex needs related to functional status, cognitive ability or social support system  3/12/2024 0554 by Genevieve Dooley, RN  Outcome: Progressing  Flowsheets (Taken 3/11/2024 2020)  Discharge to home or other facility with appropriate resources:   Identify barriers to discharge with patient and caregiver   Arrange for needed discharge resources and transportation as appropriate   Identify discharge learning needs (meds, wound care, etc)   Arrange for interpreters to assist at discharge as needed   Refer to discharge planning if patient needs post-hospital services based on physician order or complex needs related to functional status, cognitive ability or social support system     Problem: Pain  Goal: Verbalizes/displays adequate comfort level or baseline comfort level  3/12/2024 1809 by Anitha Yan, BUCK  Outcome: Adequate for Discharge  3/12/2024 0554 by Genevieve Dooley, RN  Outcome: Progressing     Problem: ABCDS Injury Assessment  Goal: Absence of physical injury  3/12/2024 1809 by Anitha Yan, BUCK  Outcome: Adequate for Discharge  Flowsheets (Taken 3/12/2024 0800)  Absence of Physical Injury: Implement safety measures based on patient assessment  3/12/2024 0554 by Genevieve Dooley, RN  Outcome: Progressing

## 2024-03-12 NOTE — PROGRESS NOTES
[facility]  Office: [phone]  Carl Cardenas DO, Yonathan Mejia DO, Jalen Benjamin DO, Toni Green DO, Loren Tam MD, Gale Nguyen MD, Daphnie De Guzman MD, Marielle Pérez MD,  Raphael Berg MD, Laz Dooley MD, Brock Antoine MD,  Yudelka Carrillo DO, Urbano Manjarrez MD, Arian Juan MD, Troy Cardenas DO, Livier Dozier MD,  Hernan Montalvo DO, Annamarie Vegas MD, Blanca Rodriguez MD, Fátima Collado MD, Matthew Borrego MD,  Forrest Corbett MD, Yasmany Macias MD, Elvis Mccarty MD, Sridhar Damon MD, Herbie Johnson MD, Gatito He MD, Feliciano Hussein DO, Dwayne Martinez DO, Awa Thapa MD,  Reese Lucero MD, Shirley Waterhouse, CNP,  Ludy Hollis CNP, Ynf Agosto, CNP,  Tasha Mcclain, NANI, Margarita Crouch, CNP, Julissa Hernandez, CNP, Jazmine Gordon CNP, Elana Ferreira CNP, Fani Travis, CNP, Viv Stephens, PA-C, Amanda Robles PA-C, Genesis Macias, CNP, Allyson Tay, CNP, Elise Swan, CNP, Rebeka Eagle, CNS, Dayanara Levy CNP, Mildred Grewal CNP, Tracy Schwab, CNP         [facility]   IN-PATIENT SERVICE   [facility]    Progress Note    3/12/2024    11:30 AM    Name:   Antony Vieyra  MRN:     [id number]     Acct:      [id number]   Room:   [facility]/325-01   Day:  4  Admit Date:  3/8/2024  1:32 PM    PCP:   No primary care provider on file.  Code Status:  Full Code    Subjective:     Patient seen in follow-up for shortness of breath secondary to new onset atrial fibrillation with rapid ventricular response complicated by cardiomyopathy.  Patient states \"I am tired but I feel better\"    Case discussed with cardiology, long discussion with the patient follow-up.  I had a very long discussion with patient explaining that his stress test was normal and subsequently his cardiomyopathy is likely from his atrial fibrillation with rapid response.  I did discuss that long-term tachycardia can lead to cardiomyopathy.  We discussed that providing this has not been

## 2024-03-12 NOTE — PROGRESS NOTES
Pt discharged via ambulation with PCT to private vehicle with all belongings, scripts and discharge paperwork. New medications and side effects reviewed. Follow up appointments and discharge instructions reviewed. All question answered to satisfaction.

## 2024-03-26 NOTE — PROGRESS NOTES
Physician Progress Note      PATIENT:               PHILIPPE SPANGLER  CSN #:                  [id number]  :                       1965  ADMIT DATE:       3/8/2024 1:32 PM  DISCH DATE:        3/12/2024 6:09 PM  RESPONDING  PROVIDER #:        Troy ESCAMILLA DO          QUERY TEXT:    Pt admitted with new onset A fib RVR and has new systolic CHF documented. If   possible, please document further specificity regarding the acuity of CHF:    The medical record reflects the following:  Risk Factors: New onset A fib RVR/ A flutter. HTN    Clinical Indicators: SOB, dizzy, and lightheaded. BNP 4119 CXR:  Mild   perihilar and bibasilar edema.  3/11 echo: Severely reduced left ventricular systolic function with a visually   estimated EF of 20 - 25%.  Cardio consult: New PAF/AFL still with some RVR  3/11 IM progress note: New onset of CHF with reduced EF will continue with   metoprolol and Cardizem, continue IV Lasix monitor urine output and daily   weight    Treatment: IV Lasix 20 x 4 days. DC on Jardiance, Lasix, Entresto, Metoprolol,   and Eliquis. Daily weight.    Thank-you,  Dayanara Villa RN,  CDS  [email or web address]  Options provided:  -- Acute Systolic CHF/HFrEF  -- Acute on Chronic Systolic CHF/HFrEF  -- Chronic Systolic CHF/HFrEF  -- Other - I will add my own diagnosis  -- Disagree - Not applicable / Not valid  -- Disagree - Clinically unable to determine / Unknown  -- Refer to Clinical Documentation Reviewer    PROVIDER RESPONSE TEXT:    This patient is in acute systolic CHF/HFrEF.    Query created by: Dayanara Villa on 3/26/2024 1:57 PM      Electronically signed by:  Troy ESCAMILLA DO 3/26/2024 2:02 PM